# Patient Record
Sex: MALE | Race: WHITE | NOT HISPANIC OR LATINO
[De-identification: names, ages, dates, MRNs, and addresses within clinical notes are randomized per-mention and may not be internally consistent; named-entity substitution may affect disease eponyms.]

---

## 2017-07-07 ENCOUNTER — MESSAGE (OUTPATIENT)
Age: 42
End: 2017-07-07

## 2019-06-24 ENCOUNTER — EMERGENCY (EMERGENCY)
Facility: HOSPITAL | Age: 44
LOS: 1 days | End: 2019-06-24
Admitting: EMERGENCY MEDICINE
Payer: COMMERCIAL

## 2019-06-24 ENCOUNTER — EMERGENCY (EMERGENCY)
Facility: HOSPITAL | Age: 44
LOS: 1 days | End: 2019-06-24
Admitting: EMERGENCY MEDICINE
Payer: SELF-PAY

## 2019-06-24 PROCEDURE — 99284 EMERGENCY DEPT VISIT MOD MDM: CPT

## 2019-06-25 PROCEDURE — 93010 ELECTROCARDIOGRAM REPORT: CPT

## 2023-09-06 ENCOUNTER — INPATIENT (INPATIENT)
Facility: HOSPITAL | Age: 48
LOS: 3 days | Discharge: HOME | DRG: 53 | End: 2023-09-10
Attending: INTERNAL MEDICINE | Admitting: INTERNAL MEDICINE
Payer: COMMERCIAL

## 2023-09-06 VITALS
TEMPERATURE: 98 F | RESPIRATION RATE: 18 BRPM | OXYGEN SATURATION: 93 % | WEIGHT: 179.9 LBS | SYSTOLIC BLOOD PRESSURE: 162 MMHG | HEART RATE: 118 BPM | DIASTOLIC BLOOD PRESSURE: 96 MMHG | HEIGHT: 67 IN

## 2023-09-06 DIAGNOSIS — R56.9 UNSPECIFIED CONVULSIONS: ICD-10-CM

## 2023-09-06 DIAGNOSIS — S82.91XA UNSPECIFIED FRACTURE OF RIGHT LOWER LEG, INITIAL ENCOUNTER FOR CLOSED FRACTURE: Chronic | ICD-10-CM

## 2023-09-06 LAB
ADD ON TEST-SPECIMEN IN LAB: SIGNIFICANT CHANGE UP
ALBUMIN SERPL ELPH-MCNC: 4.2 G/DL — SIGNIFICANT CHANGE UP (ref 3.3–5)
ALP SERPL-CCNC: 78 U/L — SIGNIFICANT CHANGE UP (ref 40–120)
ALT FLD-CCNC: 137 U/L — HIGH (ref 12–78)
AMPHET UR-MCNC: NEGATIVE — SIGNIFICANT CHANGE UP
ANION GAP SERPL CALC-SCNC: 17 MMOL/L — SIGNIFICANT CHANGE UP (ref 5–17)
AST SERPL-CCNC: 93 U/L — HIGH (ref 15–37)
BARBITURATES UR SCN-MCNC: NEGATIVE — SIGNIFICANT CHANGE UP
BASOPHILS # BLD AUTO: 0.05 K/UL — SIGNIFICANT CHANGE UP (ref 0–0.2)
BASOPHILS NFR BLD AUTO: 0.5 % — SIGNIFICANT CHANGE UP (ref 0–2)
BENZODIAZ UR-MCNC: NEGATIVE — SIGNIFICANT CHANGE UP
BILIRUB SERPL-MCNC: 1.4 MG/DL — HIGH (ref 0.2–1.2)
BUN SERPL-MCNC: 12 MG/DL — SIGNIFICANT CHANGE UP (ref 7–23)
CALCIUM SERPL-MCNC: 8.8 MG/DL — SIGNIFICANT CHANGE UP (ref 8.5–10.1)
CHLORIDE SERPL-SCNC: 99 MMOL/L — SIGNIFICANT CHANGE UP (ref 96–108)
CK SERPL-CCNC: 194 U/L — SIGNIFICANT CHANGE UP (ref 26–308)
CO2 SERPL-SCNC: 19 MMOL/L — LOW (ref 22–31)
COCAINE METAB.OTHER UR-MCNC: NEGATIVE — SIGNIFICANT CHANGE UP
CREAT SERPL-MCNC: 1.36 MG/DL — HIGH (ref 0.5–1.3)
EGFR: 64 ML/MIN/1.73M2 — SIGNIFICANT CHANGE UP
EOSINOPHIL # BLD AUTO: 0 K/UL — SIGNIFICANT CHANGE UP (ref 0–0.5)
EOSINOPHIL NFR BLD AUTO: 0 % — SIGNIFICANT CHANGE UP (ref 0–6)
ETHANOL SERPL-MCNC: <10 MG/DL — SIGNIFICANT CHANGE UP (ref 0–10)
GLUCOSE SERPL-MCNC: 141 MG/DL — HIGH (ref 70–99)
HCT VFR BLD CALC: 41.9 % — SIGNIFICANT CHANGE UP (ref 39–50)
HGB BLD-MCNC: 15.4 G/DL — SIGNIFICANT CHANGE UP (ref 13–17)
IMM GRANULOCYTES NFR BLD AUTO: 0.4 % — SIGNIFICANT CHANGE UP (ref 0–0.9)
LYMPHOCYTES # BLD AUTO: 0.66 K/UL — LOW (ref 1–3.3)
LYMPHOCYTES # BLD AUTO: 6.7 % — LOW (ref 13–44)
MCHC RBC-ENTMCNC: 34.5 PG — HIGH (ref 27–34)
MCHC RBC-ENTMCNC: 36.8 GM/DL — HIGH (ref 32–36)
MCV RBC AUTO: 93.9 FL — SIGNIFICANT CHANGE UP (ref 80–100)
METHADONE UR-MCNC: NEGATIVE — SIGNIFICANT CHANGE UP
MONOCYTES # BLD AUTO: 0.9 K/UL — SIGNIFICANT CHANGE UP (ref 0–0.9)
MONOCYTES NFR BLD AUTO: 9.1 % — SIGNIFICANT CHANGE UP (ref 2–14)
NEUTROPHILS # BLD AUTO: 8.2 K/UL — HIGH (ref 1.8–7.4)
NEUTROPHILS NFR BLD AUTO: 83.3 % — HIGH (ref 43–77)
OPIATES UR-MCNC: NEGATIVE — SIGNIFICANT CHANGE UP
PCP SPEC-MCNC: SIGNIFICANT CHANGE UP
PCP UR-MCNC: NEGATIVE — SIGNIFICANT CHANGE UP
PLATELET # BLD AUTO: 367 K/UL — SIGNIFICANT CHANGE UP (ref 150–400)
POTASSIUM SERPL-MCNC: 3.5 MMOL/L — SIGNIFICANT CHANGE UP (ref 3.5–5.3)
POTASSIUM SERPL-SCNC: 3.5 MMOL/L — SIGNIFICANT CHANGE UP (ref 3.5–5.3)
PROT SERPL-MCNC: 7.8 GM/DL — SIGNIFICANT CHANGE UP (ref 6–8.3)
RBC # BLD: 4.46 M/UL — SIGNIFICANT CHANGE UP (ref 4.2–5.8)
RBC # FLD: 11.9 % — SIGNIFICANT CHANGE UP (ref 10.3–14.5)
SODIUM SERPL-SCNC: 135 MMOL/L — SIGNIFICANT CHANGE UP (ref 135–145)
THC UR QL: NEGATIVE — SIGNIFICANT CHANGE UP
WBC # BLD: 9.85 K/UL — SIGNIFICANT CHANGE UP (ref 3.8–10.5)
WBC # FLD AUTO: 9.85 K/UL — SIGNIFICANT CHANGE UP (ref 3.8–10.5)

## 2023-09-06 PROCEDURE — 95816 EEG AWAKE AND DROWSY: CPT | Mod: 26

## 2023-09-06 PROCEDURE — 95819 EEG AWAKE AND ASLEEP: CPT

## 2023-09-06 PROCEDURE — 99223 1ST HOSP IP/OBS HIGH 75: CPT

## 2023-09-06 PROCEDURE — 80307 DRUG TEST PRSMV CHEM ANLYZR: CPT

## 2023-09-06 PROCEDURE — 36415 COLL VENOUS BLD VENIPUNCTURE: CPT

## 2023-09-06 PROCEDURE — 84100 ASSAY OF PHOSPHORUS: CPT

## 2023-09-06 PROCEDURE — 83735 ASSAY OF MAGNESIUM: CPT

## 2023-09-06 PROCEDURE — 76705 ECHO EXAM OF ABDOMEN: CPT | Mod: 26

## 2023-09-06 PROCEDURE — 99291 CRITICAL CARE FIRST HOUR: CPT

## 2023-09-06 PROCEDURE — 83690 ASSAY OF LIPASE: CPT

## 2023-09-06 PROCEDURE — 95816 EEG AWAKE AND DROWSY: CPT

## 2023-09-06 PROCEDURE — 99497 ADVNCD CARE PLAN 30 MIN: CPT | Mod: 25

## 2023-09-06 PROCEDURE — 85027 COMPLETE CBC AUTOMATED: CPT

## 2023-09-06 PROCEDURE — 95700 EEG CONT REC W/VID EEG TECH: CPT

## 2023-09-06 PROCEDURE — 85610 PROTHROMBIN TIME: CPT

## 2023-09-06 PROCEDURE — 85730 THROMBOPLASTIN TIME PARTIAL: CPT

## 2023-09-06 PROCEDURE — 80074 ACUTE HEPATITIS PANEL: CPT

## 2023-09-06 PROCEDURE — 93010 ELECTROCARDIOGRAM REPORT: CPT

## 2023-09-06 PROCEDURE — 95714 VEEG EA 12-26 HR UNMNTR: CPT

## 2023-09-06 PROCEDURE — 74177 CT ABD & PELVIS W/CONTRAST: CPT

## 2023-09-06 PROCEDURE — 83036 HEMOGLOBIN GLYCOSYLATED A1C: CPT

## 2023-09-06 PROCEDURE — 76705 ECHO EXAM OF ABDOMEN: CPT

## 2023-09-06 PROCEDURE — 80053 COMPREHEN METABOLIC PANEL: CPT

## 2023-09-06 PROCEDURE — 84443 ASSAY THYROID STIM HORMONE: CPT

## 2023-09-06 PROCEDURE — 70450 CT HEAD/BRAIN W/O DYE: CPT | Mod: 26,MA

## 2023-09-06 RX ORDER — ACETAMINOPHEN 500 MG
650 TABLET ORAL EVERY 6 HOURS
Refills: 0 | Status: DISCONTINUED | OUTPATIENT
Start: 2023-09-06 | End: 2023-09-10

## 2023-09-06 RX ORDER — THIAMINE MONONITRATE (VIT B1) 100 MG
100 TABLET ORAL DAILY
Refills: 0 | Status: COMPLETED | OUTPATIENT
Start: 2023-09-06 | End: 2023-09-09

## 2023-09-06 RX ORDER — LEVETIRACETAM 250 MG/1
500 TABLET, FILM COATED ORAL
Refills: 0 | Status: DISCONTINUED | OUTPATIENT
Start: 2023-09-06 | End: 2023-09-10

## 2023-09-06 RX ORDER — SODIUM CHLORIDE 9 MG/ML
1000 INJECTION, SOLUTION INTRAVENOUS
Refills: 0 | Status: COMPLETED | OUTPATIENT
Start: 2023-09-06 | End: 2023-09-07

## 2023-09-06 RX ORDER — FOLIC ACID 0.8 MG
1 TABLET ORAL DAILY
Refills: 0 | Status: DISCONTINUED | OUTPATIENT
Start: 2023-09-06 | End: 2023-09-10

## 2023-09-06 RX ORDER — ESCITALOPRAM OXALATE 10 MG/1
20 TABLET, FILM COATED ORAL DAILY
Refills: 0 | Status: DISCONTINUED | OUTPATIENT
Start: 2023-09-06 | End: 2023-09-10

## 2023-09-06 RX ORDER — ONDANSETRON 8 MG/1
4 TABLET, FILM COATED ORAL EVERY 6 HOURS
Refills: 0 | Status: DISCONTINUED | OUTPATIENT
Start: 2023-09-06 | End: 2023-09-10

## 2023-09-06 RX ORDER — LIDOCAINE 4 G/100G
5 CREAM TOPICAL THREE TIMES A DAY
Refills: 0 | Status: DISCONTINUED | OUTPATIENT
Start: 2023-09-06 | End: 2023-09-10

## 2023-09-06 RX ORDER — LEVETIRACETAM 250 MG/1
1000 TABLET, FILM COATED ORAL ONCE
Refills: 0 | Status: COMPLETED | OUTPATIENT
Start: 2023-09-06 | End: 2023-09-06

## 2023-09-06 RX ORDER — SODIUM CHLORIDE 9 MG/ML
1 INJECTION INTRAMUSCULAR; INTRAVENOUS; SUBCUTANEOUS DAILY
Refills: 0 | Status: DISCONTINUED | OUTPATIENT
Start: 2023-09-06 | End: 2023-09-10

## 2023-09-06 RX ORDER — SODIUM CHLORIDE 9 MG/ML
1000 INJECTION INTRAMUSCULAR; INTRAVENOUS; SUBCUTANEOUS ONCE
Refills: 0 | Status: COMPLETED | OUTPATIENT
Start: 2023-09-06 | End: 2023-09-06

## 2023-09-06 RX ADMIN — SODIUM CHLORIDE 2000 MILLILITER(S): 9 INJECTION INTRAMUSCULAR; INTRAVENOUS; SUBCUTANEOUS at 13:07

## 2023-09-06 RX ADMIN — Medication 2 MILLIGRAM(S): at 15:37

## 2023-09-06 RX ADMIN — LEVETIRACETAM 400 MILLIGRAM(S): 250 TABLET, FILM COATED ORAL at 15:39

## 2023-09-06 NOTE — H&P ADULT - NSICDXFAMILYHX_GEN_ALL_CORE_FT
FAMILY HISTORY:  Father  Still living? Unknown  Patient's father is , Age at diagnosis: Age Unknown

## 2023-09-06 NOTE — ED PROVIDER NOTE - NS ED ROS FT
GENERAL: no fever, no chills, no weight loss  EYES: no change in vision, no irritation, no discharge, no redness, no pain  HEENT: no trouble swallowing or speaking, no throat pain, no ear pain, laceration to left side of tongue  CARDIAC: no chest pain, no palpitations   PULMONARY: no cough, SOB, wheezing, BAIRES  GI: no abdominal pain, no nausea, no vomiting, no diarrhea, no constipation, no melena, no hematochezia, no hematemesis  : no changes in urination, no dysuria, no frequency, no hematuria, no discharge  NEURO: no headache, no numbness, no weakness  MSK: no joint pain, no muscle pain, no back pain, no calf pain

## 2023-09-06 NOTE — ED PROVIDER NOTE - PROGRESS NOTE DETAILS
Joseline Crain for attending Dr. Pulliam  47 y/o male with a PMHx of seizures takes sodium pills prescribed Neurologist; presenting to the ED c/o witnessed seizure, +head-strike. Patient was at an interview when he suddenly began to seize, lasted about a minute. History of seizures and head injuries. Denies neck pain. Last seizure was six weeks ago. d/w dr stuart for consult. ordered eeg. SE,MD dr behzad maghsoudlou [place] neurologist [phone] MD CRISTINE d/w dr stuart for consult. ordered eeg. SE,MD dr behzad maghsoudlou [place] neurologist [phone] MD CRISTINE d/w dr stuart for consult. ordered eeg. SE,MD dr behzad maghsoudlou [place] neurologist [phone]   This number is not dr DESIR's phone number. pt unable to provide a different number. unable to fiind another number on internet search. MD CRISTINE s/o to dr lagos pending EEG. MD CRISTINE pt had seizure in ED while having the EEG test. Was generalized, tonic/clonic. pt was in stretcher, no fall or head strike. given 2 mg ativan iv and keppra load ordered.   dr stuart aware and advise admit for obs. MD CRISTINE

## 2023-09-06 NOTE — ED ADULT NURSE REASSESSMENT NOTE - NS ED NURSE REASSESS COMMENT FT1
Pt observed having a seizure, 2mg ativan IV administered as per , will administer Keppra as per md order. Pt safe with stretcher in lowest position. Pt observed having a seizure lasting between 30-40 seconds while pt is having an eeg, 2mg ativan IV administered as per , will administer Keppra as per md order. Pt safe with stretcher in lowest position.

## 2023-09-06 NOTE — ED PROVIDER NOTE - PHYSICAL EXAMINATION
Constitutional: NAD AOx3, mild delay in response to questions, flat affect  Eyes: PERRL EOMI  Head: Normocephalic atraumatic  Mouth: MMM  Cardiac: Tachycardic rate and rhythm  Resp: Lungs CTAB  GI: Abd s/nd/nt  Neuro: CN2-12 grossly intact, TOLEDO x 4  Skin: No visible rashes

## 2023-09-06 NOTE — ED ADULT NURSE REASSESSMENT NOTE - NS ED NURSE REASSESS COMMENT FT1
Pt observed alert, states he feels foggy and is forgetful, no seizure like activity observed at this time, all questions addressed.

## 2023-09-06 NOTE — H&P ADULT - NSICDXPASTMEDICALHX_GEN_ALL_CORE_FT
PAST MEDICAL HISTORY:  Chews tobacco regularly     ETOH abuse     H/O: depression     Noncompliance     Seizures     Sodium deficiency     TBI (traumatic brain injury)

## 2023-09-06 NOTE — ED ADULT NURSE NOTE - NSSEPSISSUSPECTED_ED_A_ED
PROVIDER:[TOKEN:[[id number]],FOLLOWUP:[4-6 Days]],PROVIDER:[TOKEN:[[id number]],FOLLOWUP:[7-10 Days]] PROVIDER:[TOKEN:[[id number]],FOLLOWUP:[1-3 Days]],PROVIDER:[TOKEN:[[id number]],FOLLOWUP:[1-3 Days]] No

## 2023-09-06 NOTE — ED PROVIDER NOTE - IV ALTEPLASE EXCL ABS HIDDEN
CHIEF COMPLAINT    Chief Complaint   Patient presents with   • Abdominal Pain       HPI    Patient is here for left lower quadrant pain started 3 and rather sudden she feels like she is being stabbed by an eye for last for fiber 10 minutes and then passed she has a loose stool this morning she is using had more constipation she has had no black stool bloody stool she has had some nausea did threw up several times this morning but it is better now she has had no fever no shaking chills she was recently seen in urgent care and treated for UTI did not culture out his UTI she was treated with Bactrim at that time she had left flank pain but she said this is different she has had no history of kidney stones in the past    Allergies    ALLERGIES:   Allergen Reactions   • Contrast Media HIVES       Current Medications   No current facility-administered medications for this encounter.     Current Outpatient Medications   Medication Sig Dispense Refill   • ketoconazole (NIZORAL) 200 MG tablet Take 2 tablets by mouth daily. 400 mg once for treatment of tinea versicolor 1 tablet 0   • ibuprofen (MOTRIN) 800 MG tablet Take 1 tablet by mouth 3 times daily as needed for Pain. 30 tablet 0   • sulfamethoxazole-trimethoprim (Bactrim DS) 800-160 MG per tablet Take 1 tablet by mouth in the morning and 1 tablet in the evening. Do all this for 7 days. 14 tablet 0   • acetaminophen (TYLENOL) 325 MG tablet Take 2 tablets by mouth every 6 hours as needed for Pain. 30 tablet 0       Past Medical History    Past Medical History:   Diagnosis Date   • Bronchitis    • Ovarian cyst        Surgical History    Past Surgical History:   Procedure Laterality Date   • Tonsillectomy     • [place] tooth extraction         Social History    Social History     Tobacco Use   • Smoking status: Current Every Day Smoker     Packs/day: 0.25     Types: Cigarettes   • Smokeless tobacco: Never Used   • Tobacco comment: 3 cig per day   Substance Use Topics   •  Alcohol use: Yes     Comment: occasionally   • Drug use: No       Family History    No family history on file.    REVIEW OF SYSTEMS    ALL 13 SYSTEMS REVIEWED AND NEGATIVE OR NONCONTRIBUTORY UNLESS OTHERWISE NOTED IN HPI      PHYSICAL EXAM     ED Triage Vitals [11/16/22 0856]   ED Triage Vitals Group      Temp 98.4 °F (36.9 °C)      Heart Rate 94      Resp 20      /80      SpO2 98 %      EtCO2 mmHg       Height 5' 7\" (1.702 m)      Weight 250 lb 7.1 oz (113.6 kg)      Weight Scale Used Standing scale      BMI (Calculated) 39.22      IBW/kg (Calculated) 61.6       Gen:   AAOx3 in NAD  Head:  Normocephalic, without abnormal findings  HEENT:   PERRL, EOMI without Nystagmus. Sclera anicteric ptosis left eye from previous Bell's palsy she cannot wrinkle her left upper forehead and moderate ptosis infection was 2 years ago   Nose:  Clear without blood or purulent mucous   Mouth: Patent without swelling.  Moist well perfused mucous membranes  Neck: No masses, thyromegaly, posterior tenderness or meningeal signs  Resp: CTAB without wheezes, rhonchi, or rales.  CVS: RRR without significant murmur, rub or gallop  ABD: Soft abdomen it does hurt when she supine lift her legs reproduces the symptoms slightly she has pain with palpation left lower quadrant but no rebound no guarding no peritoneal signs otherwise  Back: No CVA tenderness, deformities, swelling or ecchymosis  Skin on the right side of her back right lower lumbar shows rash consistent with tinea versicolor she has a small island of several lesions left midthoracic rash is consistent with tinea versicolor dark brown pigment with underlying fair skin we will treat that with ketoconazole 40 mg single does as far as her abdominal pain the KUB shows a lot of stool in my opinion urine is negative for any kidney stone or infection she will treat that as constipation with milk of magnesia better diet     Procedures      MDM  Repeat urine to rule out stone or missed  infection will do a KUB looking for constipation  Constipation treat with dietary changes milk of magnesia tinea versicolor treat with ketoconazole 400 mg single dose      Labs  Results for orders placed or performed during the hospital encounter of 11/16/22   Urinalysis & Reflex Microscopy With Culture If Indicated   Result Value    COLOR, URINALYSIS Yellow    APPEARANCE, URINALYSIS Clear    GLUCOSE, URINALYSIS Negative    BILIRUBIN, URINALYSIS Negative    KETONES, URINALYSIS Negative    SPECIFIC GRAVITY, URINALYSIS 1.020    OCCULT BLOOD, URINALYSIS Negative    PH, URINALYSIS 8.0 (H)    PROTEIN, URINALYSIS Negative    UROBILINOGEN, URINALYSIS 0.2    NITRITE, URINALYSIS Negative    LEUKOCYTE ESTERASE, URINALYSIS Negative         Radiology  XR Abdomen AP Kub    (Results Pending)         Medications - No data to display    Rechecks          Consults      Diagnosis:  ED Diagnoses     Diagnosis Comment Associated Orders       Final diagnoses    Constipation, unspecified constipation type -- --    Tinea versicolor -- --               Summary of your Discharge Medications      Take these Medications      Details   ketoconazole 200 MG tablet  Commonly known as: NIZORAL   Take 2 tablets by mouth daily. 400 mg once for treatment of tinea versicolor            Follow Up:  April MAHESH Argueta  [address] [facility] [id number] [phone]    In 1 week  As needed, If symptoms worsen     Patient was instructed to return to the Urgent Care immediately if symptoms worsen or any new unusual symptoms arise.         Recheck on patient. Discussed with patient, findings and plan for discharge. Patient was given Urgent Care warnings, discharge instructions, and follow up information to go home with. Patient understands and agrees with plan for discharge. Any questions have been answered.               Thiago Davey,   11/16/22 0941     show

## 2023-09-06 NOTE — ED ADULT TRIAGE NOTE - CHIEF COMPLAINT QUOTE
Pt presents to ER s/p witnessed seizure. Pt was at an interview when he began to seize; lasted about on minute. Pt hit back of head on ground. Hx of head injury/seizures. Airway patent. no blood thinners

## 2023-09-06 NOTE — ED ADULT NURSE NOTE - OBJECTIVE STATEMENT
Pt presents to er with complaints of witnessed seizure while having an interview, states he is on sodium chloride pills for seizure management and has not taken them for the past 3 days because he didn't get around to it, pt denies headache, cervical tenderness, pt alert to name, place, time, situation, states his last seizure was 6 weeks ago.

## 2023-09-06 NOTE — ED PROVIDER NOTE - OBJECTIVE STATEMENT
Pt is a 47 yo M with PMHx of seizures presenting after witnessed seizure at job interview. Pt states that about 3 years ago he slipped in the shower and hit his head. Since then he has had multiple seizures, he states that he is unsure what type of seizure or how many. He states that he is on a salt pill for seizure management. Pt states that he was told he hit his head during the seizure. AOx3, denies headaches, denies dizziness, N/V. States that he bit his tongue during the event. Denies specific aura before seizure comes on. Pt denies other medical conditions. NKDA. Pt is a 47 yo M with PMHx of seizures presenting after witnessed seizure at job interview. Pt states that about 3 years ago he slipped in the shower and hit his head. Since then he has had multiple seizures, he states that he is unsure what type of seizure or how many. He states that he is on a salt pill for seizure management. Pt does not recall name of neurologist seen for seizures but says follows with PCP for it. Pt states that he was told he hit his head during the seizure. AOx3, denies headaches, denies dizziness, N/V. States that he bit his tongue during the event. Denies specific aura before seizure comes on. Pt denies other medical conditions. NKDA.

## 2023-09-06 NOTE — H&P ADULT - HISTORY OF PRESENT ILLNESS
Pt is a 49 yo male with a pmh/o TBI, prior seizures (states has had a min of 3-4 over past few years with last occurring 2-3 mo ago, unwitnessed but noted to have bitten tongue each time), who chews tobacco and drinks etoh 3-4 night a week (states has about 4 beers each time) with last drink being 2 days ago where he drank a six pack, who is also non compliant with sodium tablets (instead of daily takes every 2-3 days) or follow up with neurologist Dr. Márquez, who is also non compliant with escitalopram for which he takes for depression, who presented to ED after having witness seizure during a job interview. Pt states prior to seizure he felt "sweaty and foggy in the head". States sat in car with cold air and sx subsided and believed interview went well however pt then states he awoke on the floor surrounded by EMT. Pt currently c/o sluggish speech and fatigue. Pt also with pain to left side of tongue where he has a laceration from bite.      ED course: Pt evaluation by neurology, EEG performed and showing evidence of seizure activity. Keppra started.

## 2023-09-06 NOTE — H&P ADULT - TIME BILLING
A minimum of 75 min was spent completing this admission not including time spent discussing advanced care planning or discussing goals of care with a minimum of 36 min spent face to face with patient while in ED unit on admission, counseling patient on current acute on chronic conditions and discussing plan and coordination of care including diagnostic imaging such as EEG in AM, diagnostic laboratory tests which were ordered and reasoning behind each test, discussion of consultants ordered to evaluate patient in am and reasoning behind each specific need such as neurology , SBIRT.

## 2023-09-06 NOTE — CONSULT NOTE ADULT - ASSESSMENT
49 yo M with PMHx of TBI 3 years ago, subsequently had seizures, f/u with PMD at [place] including a neurologist, does not take any antiseizure medications, takes NaCl pills for salt wasting following TBI; he presented to ED today after a witnessed seizure while at a job interview today, lasted about a minute. In ED, he was initially confused/postictal, upon neuro exam; patient's mental sensorium had improved.  Routine EEG was ordered, while pt was having EEG in ED, he has another GTC seizure lasting between 30-40 seconds, was given 2 mg ativan IV followed by Keppra load.    # 2 seizures within 4-5 hours, history of seizures following TBI, patient unclear about his seizure history, does not recall when last seizure was.  EEG suggestive of focal onset seizure initiating from right frontotemporal region secondary generalization    # History of TBI    # Elevated transaminases and creatinine    - Continue Keppra 500 Mg twice daily  - Monitor LFTs and creatinine, obtain CPK  - 24-hour EEG monitoring in a.m.  - Admit for obs and seizure precautions    Above D/W Dr. Barraza

## 2023-09-06 NOTE — ED ADULT NURSE NOTE - NSFALLUNIVINTERV_ED_ALL_ED
Bed/Stretcher in lowest position, wheels locked, appropriate side rails in place/Call bell, personal items and telephone in reach/Instruct patient to call for assistance before getting out of bed/chair/stretcher/Non-slip footwear applied when patient is off stretcher/[place] to call system/Physically safe environment - no spills, clutter or unnecessary equipment/Purposeful proactive rounding/Room/bathroom lighting operational, light cord in reach

## 2023-09-06 NOTE — ED ADULT NURSE REASSESSMENT NOTE - NS ED NURSE REASSESS COMMENT FT1
Pt denies chest pain, sob, no observable seizure like activity at this time, states he just feels out of it.

## 2023-09-06 NOTE — H&P ADULT - ASSESSMENT
47 yo male with pmh/o TBI, etoh abuse, tobacco use, prior seizures, sodium deficiency on salt tabs, admitted due to;    #Seizure  Admit to med/surg with remote tele and   EEG 24 hs ordered for AM  CPK wnl  s/p 1L IVF, supplemental IVF ordered x 1L  fall, aspiration, seizure precautions  neurochecks q 4 hrs  OOB and ambulate with assitance  Keppra 500 bid ordered  lidocaine swish and spit for tongue laceration  SCD for dvt ppx    #Renal insufficiency  Cr 1.36, baseline unknown  IVF as above  f/u CMP in AM    #Hyperglycemia  f/u A1c  monitor on serum chemistry in AM    #Transaminitis  #ETOH abuse  CIWA for trigger only  cessation counseling  RUQ US to evaluate for cirrhosis/fatty liver    #Depression  c/w escitalopram    #Na deficiency   c/w salt tabs    #Tobacco use  cessation counseling  declined NRT    #Noncompliance  Pt non compliant with medications, stating sometimes he forgets and he doesn't really like taking pills  Compliance endorsed

## 2023-09-06 NOTE — ED ADULT TRIAGE NOTE - WEIGHT IN KG
81.6 Sski Pregnancy And Lactation Text: This medication is Pregnancy Category D and isn't considered safe during pregnancy. It is excreted in breast milk.

## 2023-09-06 NOTE — ED PROVIDER NOTE - NSFOLLOWUPINSTRUCTIONS_ED_ALL_ED_FT
Seizure, Adult  When you have a seizure:    Parts of your body may move.  How aware or awake (conscious) you are may change.  You may shake (convulse).    Some people have symptoms right before a seizure happens. These symptoms may include:    Fear.  Worry (anxiety).  Feeling like you are going to throw up (nausea).  Feeling like the room is spinning (vertigo).  Feeling like you saw or heard something before (felicia vu).  Odd tastes or smells.  Changes in vision, such as seeing flashing lights or spots.    ImageSeizures usually last from 30 seconds to 2 minutes. Usually, they are not harmful unless they last a long time.    Follow these instructions at home:  Medicines     Take over-the-counter and prescription medicines only as told by your doctor.  Avoid anything that may keep your medicine from working, such as alcohol.  Activity     Do not do any activities that would be dangerous if you had another seizure, like driving or swimming. Wait until your doctor approves.  If you live in the [place] ask your local DMV (department of [place]) when you can drive.  Rest.  Teaching others     Teach friends and family what to do when you have a seizure. They should:    Lay you on the ground.  Protect your head and body.  Loosen any tight clothing around your neck.  Turn you on your side.  Stay with you until you are better.  Not hold you down.  Not put anything in your mouth.  Know whether or not you need emergency care.    General instructions     Contact your doctor each time you have a seizure.  Avoid anything that gives you seizures.  Keep a seizure diary. Write down:    What you think caused each seizure.  What you remember about each seizure.    Keep all follow-up visits as told by your doctor. This is important.  Contact a doctor if:  You have another seizure.  You have seizures more often.  There is any change in what happens during your seizures.  You continue to have seizures with treatment.  You have symptoms of being sick or having an infection.  Get help right away if:  You have a seizure:    That lasts longer than 5 minutes.  That is different than seizures you had before.  That makes it harder to breathe.  After you hurt your head.    After a seizure, you cannot speak or use a part of your body.  After a seizure, you are confused or have a bad headache.  You have two or more seizures in a row.  You are having seizures more often.  You do not wake up right after a seizure.  You get hurt during a seizure.  In an emergency:     These symptoms may be an emergency. Do not wait to see if the symptoms will go away. Get medical help right away. Call your local emergency services (911 in the [place] Do not drive yourself to the hospital.   This information is not intended to replace advice given to you by your health care provider. Make sure you discuss any questions you have with your health care provider. ave with your health care provider.

## 2023-09-06 NOTE — ED ADULT NURSE REASSESSMENT NOTE - NS ED NURSE REASSESS COMMENT FT1
Pt. received from previous RN in stable condition, no s/sx of resp. distress/pain.  Able to make all needs known, no seizure activity witnessed at this time.  Laceration to lt. side of tongue observed.  Abd. sono completed, CIWA protocol and neuro checks in prog.  VSS, safety maintained, and emotional support provided.  Call bell within reach, will continue to monitor.

## 2023-09-06 NOTE — CONSULT NOTE ADULT - SUBJECTIVE AND OBJECTIVE BOX
CC 48y old  Male who presents with a chief complaint of seizure    HPI:  49 yo M with PMHx of TBI and seizures presented to ED after witnessed seizure at job interview. [place] was at a job interview in [place] today, while seated on a chair he has no recollection as to what happened, he apparently had a seizure that lasted about a minute, per triage note, pt hit back of head on ground, no major injuries, but had a small tongue bite. In ED, he was initially confused and postictal, neurology was consulted, upon my exam; patient's mental sensorium had cleared, he was able to provide history.    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  Patient is unclear and evasive about the seizures, he does not recall when his last seizure was.     I recommended routine EEG and close monitoring, while pt was having EEG in ED, he has another GTC seizure lasting between 30-40 seconds was given 2 mg ativan IV followed by Keppra load.      PAST MEDICAL & SURGICAL HISTORY:  TBI   Seizure      FAMILY HISTORY:  No relevant history      Social Hx: Worked in sales / insurance, denied smoker, no drug use      MEDICATIONS  (STANDING):  Nacl pills       Allergies  No Known Allergies    Intolerances      ROS: Pertinent positives in HPI, all other ROS were reviewed and are negative.        Vital Signs Last 24 Hrs  T(C): 36.8 (06 Sep 2023 12:30), Max: 36.8 (06 Sep 2023 12:30)  T(F): 98.3 (06 Sep 2023 12:30), Max: 98.3 (06 Sep 2023 12:30)  HR: 99 (06 Sep 2023 15:30) (97 - 118)  BP: 158/95 (06 Sep 2023 15:30) (147/94 - 162/96)  BP(mean): 111 (06 Sep 2023 15:30) (107 - 111)  RR: 18 (06 Sep 2023 15:30) (17 - 18)  SpO2: 97% (06 Sep 2023 15:01) (93% - 97%)    Parameters below as of 06 Sep 2023 15:01  Patient On (Oxygen Delivery Method): room air      Gen exam:  Normocephalic, in no distress, awake and alert.  HEENT: PERRLA, EOMI,   Neck: Supple.  Respiratory: Breath sounds are clear bilaterally  Cardiovascular: S1 and S2, regular   Extremities:  no edema  Vascular: Carotid Bruit - no  Musculoskeletal: no joint swelling/tenderness, no abnormal movements  Skin: No rashes      Neurological exam:  HF: A x O x 3. interactive, normal affect. Speech fluent, No Aphasia or paraphasic errors. Naming /repetition intact   CN: JIMY, EOMI, VFF, facial sensation normal, no NLFD, tongue bite +, Palate moves equally,  Motor: No pronator drift, Strength 5/5 in all 4 ext, normal bulk and tone, no tremor  Sens: Intact to light touch   Reflexes: Symmetric and normal downgoing toes b/l  Coord:  No FNFA,   Gait/Balance: Not tested          Labs:   09-06    135  |  99  |  12  ----------------------------<  141<H>  3.5   |  19<L>  |  1.36<H>    Ca    8.8      06 Sep 2023 12:44    TPro  7.8  /  Alb  4.2  /  TBili  1.4<H>  /  DBili  x   /  AST  93<H>  /  ALT  137<H>  /  AlkPhos  78  09-06                        15.4   9.85  )-----------( 367      ( 06 Sep 2023 12:44 )             41.9       Radiology:  -< from: CT Head No Cont (09.06.23 @ 13:50) >  No hydrocephalus, mass effect, midline shift, acute intracranial   hemorrhage, or brain edema.    Encephalomalacia and gliosis in the inferior anterior frontal lobes,   likely post traumatic in nature.    Intraorbital contents unremarkable.    Displaced left occipital bone fracture appears chronic.    And right posterior ethmoid sinus mucosal thickening, remaining   visualized paranasal sinuses and mastoid air cells are clear.    IMPRESSION:    No acute intracranial hemorrhage, brain edema, or mass effect.    Displaced left occipital bone fracture appears chronic.      < from: EEG Awake or Drowsy (09.06.23 @ 16:00) >  EEG Summary/Classification:  This was an abnormal EEG study in the awake and drowsy states:  -A reported clinical event accompanied by rhythmic activity in the right   frontotemporal region with spread to generalized fast activity  -Generalized slowing following the clinical event.    EEG  Impression:  This EEG without video captured a clinical event. Based on the limited   EEG findings (electrodes became removed) there is suspicion of a focal   onset seizure (possibly right frontotemporal) with rapid generalization.  Generalized slowing was likely a postictal phenomenon.  Consider a repeat study if clinically indicated.

## 2023-09-07 LAB
A1C WITH ESTIMATED AVERAGE GLUCOSE RESULT: 5.2 % — SIGNIFICANT CHANGE UP (ref 4–5.6)
ALBUMIN SERPL ELPH-MCNC: 3.3 G/DL — SIGNIFICANT CHANGE UP (ref 3.3–5)
ALP SERPL-CCNC: 64 U/L — SIGNIFICANT CHANGE UP (ref 40–120)
ALT FLD-CCNC: 126 U/L — HIGH (ref 12–78)
ANION GAP SERPL CALC-SCNC: 6 MMOL/L — SIGNIFICANT CHANGE UP (ref 5–17)
APTT BLD: 27.2 SEC — SIGNIFICANT CHANGE UP (ref 24.5–35.6)
AST SERPL-CCNC: 138 U/L — HIGH (ref 15–37)
BILIRUB SERPL-MCNC: 2 MG/DL — HIGH (ref 0.2–1.2)
BUN SERPL-MCNC: 7 MG/DL — SIGNIFICANT CHANGE UP (ref 7–23)
CALCIUM SERPL-MCNC: 7.7 MG/DL — LOW (ref 8.5–10.1)
CHLORIDE SERPL-SCNC: 105 MMOL/L — SIGNIFICANT CHANGE UP (ref 96–108)
CO2 SERPL-SCNC: 26 MMOL/L — SIGNIFICANT CHANGE UP (ref 22–31)
CREAT SERPL-MCNC: 0.8 MG/DL — SIGNIFICANT CHANGE UP (ref 0.5–1.3)
EGFR: 109 ML/MIN/1.73M2 — SIGNIFICANT CHANGE UP
ESTIMATED AVERAGE GLUCOSE: 103 MG/DL — SIGNIFICANT CHANGE UP (ref 68–114)
GLUCOSE SERPL-MCNC: 93 MG/DL — SIGNIFICANT CHANGE UP (ref 70–99)
HCT VFR BLD CALC: 37.3 % — LOW (ref 39–50)
HGB BLD-MCNC: 13.4 G/DL — SIGNIFICANT CHANGE UP (ref 13–17)
INR BLD: 1.04 RATIO — SIGNIFICANT CHANGE UP (ref 0.85–1.18)
MAGNESIUM SERPL-MCNC: 2.5 MG/DL — SIGNIFICANT CHANGE UP (ref 1.6–2.6)
MCHC RBC-ENTMCNC: 34.1 PG — HIGH (ref 27–34)
MCHC RBC-ENTMCNC: 35.9 GM/DL — SIGNIFICANT CHANGE UP (ref 32–36)
MCV RBC AUTO: 94.9 FL — SIGNIFICANT CHANGE UP (ref 80–100)
PHOSPHATE SERPL-MCNC: 3.1 MG/DL — SIGNIFICANT CHANGE UP (ref 2.5–4.5)
PLATELET # BLD AUTO: 240 K/UL — SIGNIFICANT CHANGE UP (ref 150–400)
POTASSIUM SERPL-MCNC: 3.2 MMOL/L — LOW (ref 3.5–5.3)
POTASSIUM SERPL-SCNC: 3.2 MMOL/L — LOW (ref 3.5–5.3)
PROT SERPL-MCNC: 6.4 GM/DL — SIGNIFICANT CHANGE UP (ref 6–8.3)
PROTHROM AB SERPL-ACNC: 11.7 SEC — SIGNIFICANT CHANGE UP (ref 9.5–13)
RBC # BLD: 3.93 M/UL — LOW (ref 4.2–5.8)
RBC # FLD: 11.8 % — SIGNIFICANT CHANGE UP (ref 10.3–14.5)
SODIUM SERPL-SCNC: 137 MMOL/L — SIGNIFICANT CHANGE UP (ref 135–145)
TSH SERPL-MCNC: 0.99 UU/ML — SIGNIFICANT CHANGE UP (ref 0.34–4.82)
WBC # BLD: 4.18 K/UL — SIGNIFICANT CHANGE UP (ref 3.8–10.5)
WBC # FLD AUTO: 4.18 K/UL — SIGNIFICANT CHANGE UP (ref 3.8–10.5)

## 2023-09-07 PROCEDURE — 99232 SBSQ HOSP IP/OBS MODERATE 35: CPT

## 2023-09-07 PROCEDURE — 99233 SBSQ HOSP IP/OBS HIGH 50: CPT

## 2023-09-07 PROCEDURE — 95819 EEG AWAKE AND ASLEEP: CPT | Mod: 26

## 2023-09-07 RX ORDER — DEXTROSE MONOHYDRATE, SODIUM CHLORIDE, AND POTASSIUM CHLORIDE 50; .745; 4.5 G/1000ML; G/1000ML; G/1000ML
1000 INJECTION, SOLUTION INTRAVENOUS
Refills: 0 | Status: DISCONTINUED | OUTPATIENT
Start: 2023-09-07 | End: 2023-09-08

## 2023-09-07 RX ADMIN — LEVETIRACETAM 500 MILLIGRAM(S): 250 TABLET, FILM COATED ORAL at 21:26

## 2023-09-07 RX ADMIN — SODIUM CHLORIDE 80 MILLILITER(S): 9 INJECTION, SOLUTION INTRAVENOUS at 00:05

## 2023-09-07 RX ADMIN — DEXTROSE MONOHYDRATE, SODIUM CHLORIDE, AND POTASSIUM CHLORIDE 100 MILLILITER(S): 50; .745; 4.5 INJECTION, SOLUTION INTRAVENOUS at 21:27

## 2023-09-07 RX ADMIN — Medication 1 MILLIGRAM(S): at 09:38

## 2023-09-07 RX ADMIN — SODIUM CHLORIDE 1 GRAM(S): 9 INJECTION INTRAMUSCULAR; INTRAVENOUS; SUBCUTANEOUS at 09:38

## 2023-09-07 RX ADMIN — DEXTROSE MONOHYDRATE, SODIUM CHLORIDE, AND POTASSIUM CHLORIDE 100 MILLILITER(S): 50; .745; 4.5 INJECTION, SOLUTION INTRAVENOUS at 09:38

## 2023-09-07 RX ADMIN — ESCITALOPRAM OXALATE 20 MILLIGRAM(S): 10 TABLET, FILM COATED ORAL at 09:38

## 2023-09-07 RX ADMIN — Medication 1 TABLET(S): at 09:38

## 2023-09-07 RX ADMIN — Medication 100 MILLIGRAM(S): at 09:39

## 2023-09-07 RX ADMIN — LEVETIRACETAM 500 MILLIGRAM(S): 250 TABLET, FILM COATED ORAL at 00:05

## 2023-09-07 RX ADMIN — LEVETIRACETAM 500 MILLIGRAM(S): 250 TABLET, FILM COATED ORAL at 09:38

## 2023-09-08 LAB
ADD ON TEST-SPECIMEN IN LAB: SIGNIFICANT CHANGE UP
ALBUMIN SERPL ELPH-MCNC: 3.3 G/DL — SIGNIFICANT CHANGE UP (ref 3.3–5)
ALP SERPL-CCNC: 78 U/L — SIGNIFICANT CHANGE UP (ref 40–120)
ALT FLD-CCNC: 369 U/L — HIGH (ref 12–78)
ANION GAP SERPL CALC-SCNC: 7 MMOL/L — SIGNIFICANT CHANGE UP (ref 5–17)
AST SERPL-CCNC: 577 U/L — HIGH (ref 15–37)
BILIRUB SERPL-MCNC: 2 MG/DL — HIGH (ref 0.2–1.2)
BUN SERPL-MCNC: 8 MG/DL — SIGNIFICANT CHANGE UP (ref 7–23)
CALCIUM SERPL-MCNC: 8 MG/DL — LOW (ref 8.5–10.1)
CHLORIDE SERPL-SCNC: 103 MMOL/L — SIGNIFICANT CHANGE UP (ref 96–108)
CO2 SERPL-SCNC: 26 MMOL/L — SIGNIFICANT CHANGE UP (ref 22–31)
CREAT SERPL-MCNC: 0.88 MG/DL — SIGNIFICANT CHANGE UP (ref 0.5–1.3)
EGFR: 106 ML/MIN/1.73M2 — SIGNIFICANT CHANGE UP
GLUCOSE SERPL-MCNC: 98 MG/DL — SIGNIFICANT CHANGE UP (ref 70–99)
LIDOCAIN IGE QN: 57 U/L — SIGNIFICANT CHANGE UP (ref 13–75)
POTASSIUM SERPL-MCNC: 3.9 MMOL/L — SIGNIFICANT CHANGE UP (ref 3.5–5.3)
POTASSIUM SERPL-SCNC: 3.9 MMOL/L — SIGNIFICANT CHANGE UP (ref 3.5–5.3)
PROT SERPL-MCNC: 6.7 GM/DL — SIGNIFICANT CHANGE UP (ref 6–8.3)
SODIUM SERPL-SCNC: 136 MMOL/L — SIGNIFICANT CHANGE UP (ref 135–145)

## 2023-09-08 PROCEDURE — 95720 EEG PHY/QHP EA INCR W/VEEG: CPT

## 2023-09-08 PROCEDURE — 99232 SBSQ HOSP IP/OBS MODERATE 35: CPT

## 2023-09-08 PROCEDURE — 74177 CT ABD & PELVIS W/CONTRAST: CPT | Mod: 26

## 2023-09-08 PROCEDURE — 99233 SBSQ HOSP IP/OBS HIGH 50: CPT

## 2023-09-08 RX ORDER — ENOXAPARIN SODIUM 100 MG/ML
40 INJECTION SUBCUTANEOUS EVERY 24 HOURS
Refills: 0 | Status: DISCONTINUED | OUTPATIENT
Start: 2023-09-08 | End: 2023-09-10

## 2023-09-08 RX ADMIN — ENOXAPARIN SODIUM 40 MILLIGRAM(S): 100 INJECTION SUBCUTANEOUS at 21:13

## 2023-09-08 RX ADMIN — Medication 1 TABLET(S): at 10:18

## 2023-09-08 RX ADMIN — SODIUM CHLORIDE 1 GRAM(S): 9 INJECTION INTRAMUSCULAR; INTRAVENOUS; SUBCUTANEOUS at 10:17

## 2023-09-08 RX ADMIN — LEVETIRACETAM 500 MILLIGRAM(S): 250 TABLET, FILM COATED ORAL at 10:18

## 2023-09-08 RX ADMIN — Medication 1 MILLIGRAM(S): at 10:17

## 2023-09-08 RX ADMIN — ESCITALOPRAM OXALATE 20 MILLIGRAM(S): 10 TABLET, FILM COATED ORAL at 10:17

## 2023-09-08 RX ADMIN — Medication 100 MILLIGRAM(S): at 10:17

## 2023-09-08 RX ADMIN — LEVETIRACETAM 500 MILLIGRAM(S): 250 TABLET, FILM COATED ORAL at 21:13

## 2023-09-08 RX ADMIN — DEXTROSE MONOHYDRATE, SODIUM CHLORIDE, AND POTASSIUM CHLORIDE 100 MILLILITER(S): 50; .745; 4.5 INJECTION, SOLUTION INTRAVENOUS at 10:58

## 2023-09-09 LAB
ALBUMIN SERPL ELPH-MCNC: 3.7 G/DL — SIGNIFICANT CHANGE UP (ref 3.3–5)
ALP SERPL-CCNC: 91 U/L — SIGNIFICANT CHANGE UP (ref 40–120)
ALT FLD-CCNC: 452 U/L — HIGH (ref 12–78)
ANION GAP SERPL CALC-SCNC: 4 MMOL/L — LOW (ref 5–17)
AST SERPL-CCNC: 473 U/L — HIGH (ref 15–37)
BILIRUB SERPL-MCNC: 1.5 MG/DL — HIGH (ref 0.2–1.2)
BUN SERPL-MCNC: 7 MG/DL — SIGNIFICANT CHANGE UP (ref 7–23)
CALCIUM SERPL-MCNC: 8.6 MG/DL — SIGNIFICANT CHANGE UP (ref 8.5–10.1)
CHLORIDE SERPL-SCNC: 101 MMOL/L — SIGNIFICANT CHANGE UP (ref 96–108)
CO2 SERPL-SCNC: 27 MMOL/L — SIGNIFICANT CHANGE UP (ref 22–31)
CREAT SERPL-MCNC: 0.89 MG/DL — SIGNIFICANT CHANGE UP (ref 0.5–1.3)
EGFR: 106 ML/MIN/1.73M2 — SIGNIFICANT CHANGE UP
GLUCOSE SERPL-MCNC: 91 MG/DL — SIGNIFICANT CHANGE UP (ref 70–99)
HAV IGM SER-ACNC: SIGNIFICANT CHANGE UP
HBV CORE IGM SER-ACNC: SIGNIFICANT CHANGE UP
HBV SURFACE AG SER-ACNC: SIGNIFICANT CHANGE UP
HCV AB S/CO SERPL IA: 0.06 S/CO — SIGNIFICANT CHANGE UP (ref 0–0.99)
HCV AB SERPL-IMP: SIGNIFICANT CHANGE UP
POTASSIUM SERPL-MCNC: 3.7 MMOL/L — SIGNIFICANT CHANGE UP (ref 3.5–5.3)
POTASSIUM SERPL-SCNC: 3.7 MMOL/L — SIGNIFICANT CHANGE UP (ref 3.5–5.3)
PROT SERPL-MCNC: 7.2 GM/DL — SIGNIFICANT CHANGE UP (ref 6–8.3)
SODIUM SERPL-SCNC: 132 MMOL/L — LOW (ref 135–145)

## 2023-09-09 PROCEDURE — 99232 SBSQ HOSP IP/OBS MODERATE 35: CPT

## 2023-09-09 RX ADMIN — Medication 1 MILLIGRAM(S): at 10:33

## 2023-09-09 RX ADMIN — Medication 1 TABLET(S): at 10:34

## 2023-09-09 RX ADMIN — Medication 100 MILLIGRAM(S): at 10:34

## 2023-09-09 RX ADMIN — LEVETIRACETAM 500 MILLIGRAM(S): 250 TABLET, FILM COATED ORAL at 10:33

## 2023-09-09 RX ADMIN — ENOXAPARIN SODIUM 40 MILLIGRAM(S): 100 INJECTION SUBCUTANEOUS at 21:14

## 2023-09-09 RX ADMIN — LEVETIRACETAM 500 MILLIGRAM(S): 250 TABLET, FILM COATED ORAL at 21:14

## 2023-09-09 RX ADMIN — ESCITALOPRAM OXALATE 20 MILLIGRAM(S): 10 TABLET, FILM COATED ORAL at 10:33

## 2023-09-09 RX ADMIN — SODIUM CHLORIDE 1 GRAM(S): 9 INJECTION INTRAMUSCULAR; INTRAVENOUS; SUBCUTANEOUS at 10:33

## 2023-09-09 NOTE — PROGRESS NOTE ADULT - SUBJECTIVE AND OBJECTIVE BOX
HPI: Pt is a 49 yo male with a pmh/o TBI, prior seizures (states has had a min of 3-4 over past few years with last occurring 2-3 mo ago, unwitnessed but noted to have bitten tongue each time), who chews tobacco and drinks etoh 3-4 night a week (states has about 4 beers each time) with last drink being 2 days ago where he drank a six pack, who is also non compliant with sodium tablets (instead of daily takes every 2-3 days) or follow up with neurologist Dr. Márquez, who is also non compliant with escitalopram for which he takes for depression, who presented to ED after having witness seizure during a job interview. Pt states prior to seizure he felt "sweaty and foggy in the head". States sat in car with cold air and sx subsided and believed interview went well however pt then states he awoke on the floor surrounded by EMT. Pt currently c/o sluggish speech and fatigue. Pt also with pain to left side of tongue where he has a laceration from bite.      ED course: Pt evaluation by neurology, EEG performed and showing evidence of seizure activity. Keppra started.     : no complaints; no seizures today; 24 hr EEG per neuro    : no new complaints  increasing LFTs; CT abdo; GI consult    : no complaints  await GI consult    PHYSICAL EXAM:    Daily Height in cm: 170.18 (06 Sep 2023 12:30)    Daily Weight in k.7 (07 Sep 2023 01:00)    Vital Signs Last 24 Hrs  T(C): 36.8 (09 Sep 2023 08:40), Max: 37.5 (08 Sep 2023 16:14)  T(F): 98.3 (09 Sep 2023 08:40), Max: 99.5 (08 Sep 2023 16:14)  HR: 58 (09 Sep 2023 08:40) (58 - 75)  BP: 127/88 (09 Sep 2023 08:40) (117/82 - 127/88)  BP(mean): --  RR: 16 (09 Sep 2023 08:40) (16 - 18)  SpO2: 96% (09 Sep 2023 08:40) (96% - 99%)    Parameters below as of 09 Sep 2023 08:40  Patient On (Oxygen Delivery Method): room air        Constitutional: Well appearing  HEENT: Atraumatic, LINDSEY,   Respiratory: Breath Sounds normal, no rhonchi/wheeze  Cardiovascular: N S1S2;   Gastrointestinal: Abdomen soft, non tender, Bowel Sounds present  Extremities: No edema, peripheral pulses present  Neurological: AAO x 3, no gross focal motor deficits  Skin: Non cellulitic, no rash, ulcers  Lymph Nodes: No lymphadenopathy noted  Back: No CVA tenderness   Musculoskeletal: non tender  Breasts: Deferred  Genitourinary: deferred  Rectal: Deferred    All Labs/EKG/Radiology/Meds reviewed        132<L>  |  101  |  7   ----------------------------<  91  3.7   |  27  |  0.89    Ca    8.6      09 Sep 2023 06:43    TPro  7.2  /  Alb  3.7  /  TBili  1.5<H>  /  DBili  x   /  AST  473<H>  /  ALT  452<H>  /  AlkPhos  91      Lab Results:  CBC  CBC Full  -  ( 07 Sep 2023 06:58 )  WBC Count : 4.18 K/uL  RBC Count : 3.93 M/uL  Hemoglobin : 13.4 g/dL  Hematocrit : 37.3 %  Platelet Count - Automated : 240 K/uL  Mean Cell Volume : 94.9 fl  Mean Cell Hemoglobin : 34.1 pg  Mean Cell Hemoglobin Concentration : 35.9 gm/dL  Auto Neutrophil # : x  Auto Lymphocyte # : x  Auto Monocyte # : x  Auto Eosinophil # : x  Auto Basophil # : x  Auto Neutrophil % : x  Auto Lymphocyte % : x  Auto Monocyte % : x  Auto Eosinophil % : x  Auto Basophil % : x    .		Differential:	[] Automated		[] Manual  Chemistry      136  |  103  |  8   ----------------------------<  98  3.9   |  26  |  0.88    Ca    8.0<L>      08 Sep 2023 06:25  Phos  3.1       Mg     2.5         TPro  6.7  /  Alb  3.3  /  TBili  2.0<H>  /  DBili  x   /  AST  577<H>  /  ALT  369<H>  /  AlkPhos  78      LIVER FUNCTIONS - ( 08 Sep 2023 06:25 )  Alb: 3.3 g/dL / Pro: 6.7 gm/dL / ALK PHOS: 78 U/L / ALT: 369 U/L / AST: 577 U/L / GGT: x           PT/INR - ( 07 Sep 2023 06:58 )   PT: 11.7 sec;   INR: 1.04 ratio         PTT - ( 07 Sep 2023 06:58 )  PTT:27.2 sec  Urinalysis Basic - ( 08 Sep 2023 06:25 )    Color: x / Appearance: x / SG: x / pH: x  Gluc: 98 mg/dL / Ketone: x  / Bili: x / Urobili: x   Blood: x / Protein: x / Nitrite: x   Leuk Esterase: x / RBC: x / WBC x   Sq Epi: x / Non Sq Epi: x / Bacteria: x        23 @ 07:01  -  23 @ 16:30  --------------------------------------------------------  IN: 0 mL / OUT: 1000 mL / NET: -1000 mL      MICROBIOLOGY/CULTURES:                                13.4   4.18  )-----------( 240      ( 07 Sep 2023 06:58 )             37.3       CBC Full  -  ( 07 Sep 2023 06:58 )  WBC Count : 4.18 K/uL  RBC Count : 3.93 M/uL  Hemoglobin : 13.4 g/dL  Hematocrit : 37.3 %  Platelet Count - Automated : 240 K/uL  Mean Cell Volume : 94.9 fl  Mean Cell Hemoglobin : 34.1 pg  Mean Cell Hemoglobin Concentration : 35.9 gm/dL  Auto Neutrophil # : x  Auto Lymphocyte # : x  Auto Monocyte # : x  Auto Eosinophil # : x  Auto Basophil # : x  Auto Neutrophil % : x  Auto Lymphocyte % : x  Auto Monocyte % : x  Auto Eosinophil % : x  Auto Basophil % : x          137  |  105  |  7   ----------------------------<  93  3.2<L>   |  26  |  0.80    Ca    7.7<L>      07 Sep 2023 06:58  Phos  3.1       Mg     2.5         TPro  6.4  /  Alb  3.3  /  TBili  2.0<H>  /  DBili  x   /  AST  138<H>  /  ALT  126<H>  /  AlkPhos  64        LIVER FUNCTIONS - ( 07 Sep 2023 06:58 )  Alb: 3.3 g/dL / Pro: 6.4 gm/dL / ALK PHOS: 64 U/L / ALT: 126 U/L / AST: 138 U/L / GGT: x             PT/INR - ( 07 Sep 2023 06:58 )   PT: 11.7 sec;   INR: 1.04 ratio         PTT - ( 07 Sep 2023 06:58 )  PTT:27.2 sec    CARDIAC MARKERS ( 06 Sep 2023 12:44 )  x     / x     / 194 U/L / x     / x            Urinalysis Basic - ( 07 Sep 2023 06:58 )    Color: x / Appearance: x / SG: x / pH: x  Gluc: 93 mg/dL / Ketone: x  / Bili: x / Urobili: x   Blood: x / Protein: x / Nitrite: x   Leuk Esterase: x / RBC: x / WBC x   Sq Epi: x / Non Sq Epi: x / Bacteria: x            MEDICATIONS  (STANDING):  dextrose 5% + sodium chloride 0.9% with potassium chloride 20 mEq/L 1000 milliLiter(s) (100 mL/Hr) IV Continuous <Continuous>  escitalopram 20 milliGRAM(s) Oral daily  folic acid 1 milliGRAM(s) Oral daily  levETIRAcetam 500 milliGRAM(s) Oral two times a day  multivitamin 1 Tablet(s) Oral daily  sodium chloride 1 Gram(s) Oral daily  thiamine 100 milliGRAM(s) Oral daily    MEDICATIONS  (PRN):  acetaminophen     Tablet .. 650 milliGRAM(s) Oral every 6 hours PRN Mild Pain (1 - 3)  lidocaine 2% Viscous 5 milliLiter(s) Swish and Spit three times a day PRN Mouth Care  LORazepam   Injectable 2 milliGRAM(s) IV Push every 2 hours PRN CIWA-Ar score increase by 2 points and a total score of 7 or less  LORazepam   Injectable 2 milliGRAM(s) IV Push every 1 hour PRN CIWA-Ar score 8 or greater  LORazepam   Injectable 2 milliGRAM(s) IV Push once PRN Seizure Activity  ondansetron Injectable 4 milliGRAM(s) IV Push every 6 hours PRN Nausea and/or Vomiting    
Interval History:  9/9/23: Reports tongue discomfort but no other new complaints.      MEDICATIONS  (STANDING):  enoxaparin Injectable 40 milliGRAM(s) SubCutaneous every 24 hours  escitalopram 20 milliGRAM(s) Oral daily  folic acid 1 milliGRAM(s) Oral daily  levETIRAcetam 500 milliGRAM(s) Oral two times a day  multivitamin 1 Tablet(s) Oral daily  sodium chloride 1 Gram(s) Oral daily  thiamine 100 milliGRAM(s) Oral daily    MEDICATIONS  (PRN):  acetaminophen     Tablet .. 650 milliGRAM(s) Oral every 6 hours PRN Mild Pain (1 - 3)  lidocaine 2% Viscous 5 milliLiter(s) Swish and Spit three times a day PRN Mouth Care  LORazepam   Injectable 2 milliGRAM(s) IV Push every 2 hours PRN CIWA-Ar score increase by 2 points and a total score of 7 or less  LORazepam   Injectable 2 milliGRAM(s) IV Push once PRN Seizure Activity  LORazepam   Injectable 2 milliGRAM(s) IV Push every 1 hour PRN CIWA-Ar score 8 or greater  ondansetron Injectable 4 milliGRAM(s) IV Push every 6 hours PRN Nausea and/or Vomiting      Allergies    No Known Allergies    Intolerances        PHYSICAL EXAM:  Vital Signs Last 24 Hrs  T(F): 98.3 (09-09-23 @ 08:40)  HR: 58 (09-09-23 @ 08:40)  BP: 127/88 (09-09-23 @ 08:40)  RR: 16 (09-09-23 @ 08:40)    GENERAL: NAD, well-groomed, well-developed  HEAD:  Atraumatic, Normocephalic  Neuro:  Awake, alert, no aphasia  CN: PERRL, EOMI, no nystagmus, no facial weakness, tongue protrudes in the midline  motor: normal tone, no pronator drift, full strength in all four extremities  sensory: intact to light touch  coordination: finger to nose intact bilaterally  gait: narrow based, steady    LABS:    09-09    132<L>  |  101  |  7   ----------------------------<  91  3.7   |  27  |  0.89    Ca    8.6      09 Sep 2023 06:43    TPro  7.2  /  Alb  3.7  /  TBili  1.5<H>  /  DBili  x   /  AST  473<H>  /  ALT  452<H>  /  AlkPhos  91  09-09      Urinalysis Basic - ( 09 Sep 2023 06:43 )    Color: x / Appearance: x / SG: x / pH: x  Gluc: 91 mg/dL / Ketone: x  / Bili: x / Urobili: x   Blood: x / Protein: x / Nitrite: x   Leuk Esterase: x / RBC: x / WBC x   Sq Epi: x / Non Sq Epi: x / Bacteria: x        RADIOLOGY & ADDITIONAL STUDIES:  CT head 9/6/23:  No acute intracranial hemorrhage, brain edema, or mass effect.  Displaced left occipital bone fracture appears chronic.    EEG 9/6/23:  This was an abnormal EEG study in the awake and drowsy states:  -A reported clinical event accompanied by rhythmic activity in the right   frontotemporal region with spread to generalized fast activity  -Generalized slowing following the clinical event.      EEG 9/7/23:  This was an abnormal EEG study in the awake and drowsy states due to:  -A right frontotemporal spike with phase reversal at F4, F8  -Excess beta activity      EEG w/ video 9/7/23-9/8/23:  normal        
HPI: Pt is a 47 yo male with a pmh/o TBI, prior seizures (states has had a min of 3-4 over past few years with last occurring 2-3 mo ago, unwitnessed but noted to have bitten tongue each time), who chews tobacco and drinks etoh 3-4 night a week (states has about 4 beers each time) with last drink being 2 days ago where he drank a six pack, who is also non compliant with sodium tablets (instead of daily takes every 2-3 days) or follow up with neurologist Dr. Márquez, who is also non compliant with escitalopram for which he takes for depression, who presented to ED after having witness seizure during a job interview. Pt states prior to seizure he felt "sweaty and foggy in the head". States sat in car with cold air and sx subsided and believed interview went well however pt then states he awoke on the floor surrounded by EMT. Pt currently c/o sluggish speech and fatigue. Pt also with pain to left side of tongue where he has a laceration from bite.      ED course: Pt evaluation by neurology, EEG performed and showing evidence of seizure activity. Keppra started.     : no complaints; no seizures today; 24 hr EEG per neuro    : no new complaints  increasing LFTs; CT abdo; GI consult    PHYSICAL EXAM:    Daily Height in cm: 170.18 (06 Sep 2023 12:30)    Daily Weight in k.7 (07 Sep 2023 01:00)    Vital Signs Last 24 Hrs  T(C): 37.5 (08 Sep 2023 16:14), Max: 37.8 (08 Sep 2023 01:51)  T(F): 99.5 (08 Sep 2023 16:14), Max: 100 (08 Sep 2023 01:51)  HR: 74 (08 Sep 2023 16:14) (64 - 86)  BP: 123/86 (08 Sep 2023 16:14) (122/83 - 145/92)  BP(mean): --  RR: 18 (08 Sep 2023 16:14) (17 - 18)  SpO2: 96% (08 Sep 2023 16:14) (95% - 99%)    Parameters below as of 08 Sep 2023 16:14  Patient On (Oxygen Delivery Method): room air        Constitutional: Weak  appearing  HEENT: Atraumatic, LINDSEY,   Respiratory: Breath Sounds normal, no rhonchi/wheeze  Cardiovascular: N S1S2;   Gastrointestinal: Abdomen soft, non tender, Bowel Sounds present  Extremities: No edema, peripheral pulses present  Neurological: AAO x 3, no gross focal motor deficits  Skin: Non cellulitic, no rash, ulcers  Lymph Nodes: No lymphadenopathy noted  Back: No CVA tenderness   Musculoskeletal: non tender  Breasts: Deferred  Genitourinary: deferred  Rectal: Deferred    All Labs/EKG/Radiology/Meds reviewed    Lab Results:  CBC  CBC Full  -  ( 07 Sep 2023 06:58 )  WBC Count : 4.18 K/uL  RBC Count : 3.93 M/uL  Hemoglobin : 13.4 g/dL  Hematocrit : 37.3 %  Platelet Count - Automated : 240 K/uL  Mean Cell Volume : 94.9 fl  Mean Cell Hemoglobin : 34.1 pg  Mean Cell Hemoglobin Concentration : 35.9 gm/dL  Auto Neutrophil # : x  Auto Lymphocyte # : x  Auto Monocyte # : x  Auto Eosinophil # : x  Auto Basophil # : x  Auto Neutrophil % : x  Auto Lymphocyte % : x  Auto Monocyte % : x  Auto Eosinophil % : x  Auto Basophil % : x    .		Differential:	[] Automated		[] Manual  Chemistry      136  |  103  |  8   ----------------------------<  98  3.9   |  26  |  0.88    Ca    8.0<L>      08 Sep 2023 06:25  Phos  3.1     -  Mg     2.5     -    TPro  6.7  /  Alb  3.3  /  TBili  2.0<H>  /  DBili  x   /  AST  577<H>  /  ALT  369<H>  /  AlkPhos  78  -    LIVER FUNCTIONS - ( 08 Sep 2023 06:25 )  Alb: 3.3 g/dL / Pro: 6.7 gm/dL / ALK PHOS: 78 U/L / ALT: 369 U/L / AST: 577 U/L / GGT: x           PT/INR - ( 07 Sep 2023 06:58 )   PT: 11.7 sec;   INR: 1.04 ratio         PTT - ( 07 Sep 2023 06:58 )  PTT:27.2 sec  Urinalysis Basic - ( 08 Sep 2023 06:25 )    Color: x / Appearance: x / SG: x / pH: x  Gluc: 98 mg/dL / Ketone: x  / Bili: x / Urobili: x   Blood: x / Protein: x / Nitrite: x   Leuk Esterase: x / RBC: x / WBC x   Sq Epi: x / Non Sq Epi: x / Bacteria: x        23 @ 07:01  -  23 @ 16:30  --------------------------------------------------------  IN: 0 mL / OUT: 1000 mL / NET: -1000 mL      MICROBIOLOGY/CULTURES:                                13.4   4.18  )-----------( 240      ( 07 Sep 2023 06:58 )             37.3       CBC Full  -  ( 07 Sep 2023 06:58 )  WBC Count : 4.18 K/uL  RBC Count : 3.93 M/uL  Hemoglobin : 13.4 g/dL  Hematocrit : 37.3 %  Platelet Count - Automated : 240 K/uL  Mean Cell Volume : 94.9 fl  Mean Cell Hemoglobin : 34.1 pg  Mean Cell Hemoglobin Concentration : 35.9 gm/dL  Auto Neutrophil # : x  Auto Lymphocyte # : x  Auto Monocyte # : x  Auto Eosinophil # : x  Auto Basophil # : x  Auto Neutrophil % : x  Auto Lymphocyte % : x  Auto Monocyte % : x  Auto Eosinophil % : x  Auto Basophil % : x          137  |  105  |  7   ----------------------------<  93  3.2<L>   |  26  |  0.80    Ca    7.7<L>      07 Sep 2023 06:58  Phos  3.1       Mg     2.5         TPro  6.4  /  Alb  3.3  /  TBili  2.0<H>  /  DBili  x   /  AST  138<H>  /  ALT  126<H>  /  AlkPhos  64  -07      LIVER FUNCTIONS - ( 07 Sep 2023 06:58 )  Alb: 3.3 g/dL / Pro: 6.4 gm/dL / ALK PHOS: 64 U/L / ALT: 126 U/L / AST: 138 U/L / GGT: x             PT/INR - ( 07 Sep 2023 06:58 )   PT: 11.7 sec;   INR: 1.04 ratio         PTT - ( 07 Sep 2023 06:58 )  PTT:27.2 sec    CARDIAC MARKERS ( 06 Sep 2023 12:44 )  x     / x     / 194 U/L / x     / x            Urinalysis Basic - ( 07 Sep 2023 06:58 )    Color: x / Appearance: x / SG: x / pH: x  Gluc: 93 mg/dL / Ketone: x  / Bili: x / Urobili: x   Blood: x / Protein: x / Nitrite: x   Leuk Esterase: x / RBC: x / WBC x   Sq Epi: x / Non Sq Epi: x / Bacteria: x            MEDICATIONS  (STANDING):  dextrose 5% + sodium chloride 0.9% with potassium chloride 20 mEq/L 1000 milliLiter(s) (100 mL/Hr) IV Continuous <Continuous>  escitalopram 20 milliGRAM(s) Oral daily  folic acid 1 milliGRAM(s) Oral daily  levETIRAcetam 500 milliGRAM(s) Oral two times a day  multivitamin 1 Tablet(s) Oral daily  sodium chloride 1 Gram(s) Oral daily  thiamine 100 milliGRAM(s) Oral daily    MEDICATIONS  (PRN):  acetaminophen     Tablet .. 650 milliGRAM(s) Oral every 6 hours PRN Mild Pain (1 - 3)  lidocaine 2% Viscous 5 milliLiter(s) Swish and Spit three times a day PRN Mouth Care  LORazepam   Injectable 2 milliGRAM(s) IV Push every 2 hours PRN CIWA-Ar score increase by 2 points and a total score of 7 or less  LORazepam   Injectable 2 milliGRAM(s) IV Push every 1 hour PRN CIWA-Ar score 8 or greater  LORazepam   Injectable 2 milliGRAM(s) IV Push once PRN Seizure Activity  ondansetron Injectable 4 milliGRAM(s) IV Push every 6 hours PRN Nausea and/or Vomiting    
Pt sitting in bed, no further seizures.  He does not recall the seizure he had in ED while having EEG, was started on Keppra    Pt now admits he has had seizures; at least 3 in the past 8 months, most seizures occur in his sleep  He lives with a room mate    His neurologist is  Dr. Li in [facility]    ROS: As above, other ROS Negative      MEDICATIONS  (STANDING):  dextrose 5% + sodium chloride 0.9% with potassium chloride 20 mEq/L 1000 milliLiter(s) (100 mL/Hr) IV Continuous <Continuous>  escitalopram 20 milliGRAM(s) Oral daily  folic acid 1 milliGRAM(s) Oral daily  levETIRAcetam 500 milliGRAM(s) Oral two times a day  multivitamin 1 Tablet(s) Oral daily  sodium chloride 1 Gram(s) Oral daily  thiamine 100 milliGRAM(s) Oral daily      Vital Signs Last 24 Hrs  T(C): 37.3 (07 Sep 2023 08:47), Max: 37.3 (07 Sep 2023 08:47)  T(F): 99.1 (07 Sep 2023 08:47), Max: 99.1 (07 Sep 2023 08:47)  HR: 72 (07 Sep 2023 08:47) (72 - 118)  BP: 141/81 (07 Sep 2023 08:47) (112/66 - 162/96)  BP(mean): 111 (06 Sep 2023 15:30) (107 - 111)  RR: 18 (07 Sep 2023 08:47) (17 - 18)  SpO2: 96% (07 Sep 2023 08:47) (93% - 99%)    Parameters below as of 07 Sep 2023 08:47  Patient On (Oxygen Delivery Method): room air      Neurological exam:  HF: A x O x 3. interactive, normal affect. Speech fluent, No Aphasia or paraphasic errors. Naming /repetition intact   CN: JIMY, EOMI, VFF, facial sensation normal, no NLFD, tongue bite +, Palate moves equally,  Motor: No pronator drift, Strength 5/5 in all 4 ext, normal bulk and tone, no tremor  Sens: Intact to light touch   Reflexes: Symmetric and normal downgoing toes b/l  Coord:  No FNFA,   Gait/Balance: Not tested                        13.4   4.18  )-----------( 240      ( 07 Sep 2023 06:58 )             37.3     09-07    137  |  105  |  7   ----------------------------<  93  3.2<L>   |  26  |  0.80    Ca    7.7<L>      07 Sep 2023 06:58  Phos  3.1     09-07  Mg     2.5     09-07    TPro  6.4  /  Alb  3.3  /  TBili  2.0<H>  /  DBili  x   /  AST  138<H>  /  ALT  126<H>  /  AlkPhos  64  09-07        Radiology report:  --< from: CT Head No Cont (09.06.23 @ 13:50) >  No hydrocephalus, mass effect, midline shift, acute intracranial   hemorrhage, or brain edema.    Encephalomalacia and gliosis in the inferior anterior frontal lobes,   likely post traumatic in nature.    Intraorbital contents unremarkable.    Displaced left occipital bone fracture appears chronic.    And right posterior ethmoid sinus mucosal thickening, remaining   visualized paranasal sinuses and mastoid air cells are clear.    IMPRESSION:    No acute intracranial hemorrhage, brain edema, or mass effect.    Displaced left occipital bone fracture appears chronic.      < from: EEG Awake or Drowsy (09.06.23 @ 16:00) >  EEG Summary/Classification:  This was an abnormal EEG study in the awake and drowsy states:  -A reported clinical event accompanied by rhythmic activity in the right   frontotemporal region with spread to generalized fast activity  -Generalized slowing following the clinical event.    EEG  Impression:  This EEG without video captured a clinical event. Based on the limited   EEG findings (electrodes became removed) there is suspicion of a focal   onset seizure (possibly right frontotemporal) with rapid generalization.  Generalized slowing was likely a postictal phenomenon.  Consider a repeat study if clinically indicated.    
HPI: Pt is a 47 yo male with a pmh/o TBI, prior seizures (states has had a min of 3-4 over past few years with last occurring 2-3 mo ago, unwitnessed but noted to have bitten tongue each time), who chews tobacco and drinks etoh 3-4 night a week (states has about 4 beers each time) with last drink being 2 days ago where he drank a six pack, who is also non compliant with sodium tablets (instead of daily takes every 2-3 days) or follow up with neurologist Dr. Márquez, who is also non compliant with escitalopram for which he takes for depression, who presented to ED after having witness seizure during a job interview. Pt states prior to seizure he felt "sweaty and foggy in the head". States sat in car with cold air and sx subsided and believed interview went well however pt then states he awoke on the floor surrounded by EMT. Pt currently c/o sluggish speech and fatigue. Pt also with pain to left side of tongue where he has a laceration from bite.      ED course: Pt evaluation by neurology, EEG performed and showing evidence of seizure activity. Keppra started.     : no complaints; no seizures today; 24 hr EEG per neuro      PHYSICAL EXAM:    Daily Height in cm: 170.18 (06 Sep 2023 12:30)    Daily Weight in k.7 (07 Sep 2023 01:00)    Vital Signs Last 24 Hrs  T(C): 37.3 (07 Sep 2023 08:47), Max: 37.3 (07 Sep 2023 08:47)  T(F): 99.1 (07 Sep 2023 08:47), Max: 99.1 (07 Sep 2023 08:47)  HR: 72 (07 Sep 2023 08:47) (72 - 118)  BP: 141/81 (07 Sep 2023 08:47) (112/66 - 162/96)  BP(mean): 111 (06 Sep 2023 15:30) (107 - 111)  RR: 18 (07 Sep 2023 08:47) (17 - 18)  SpO2: 96% (07 Sep 2023 08:47) (93% - 99%)    Constitutional: Weak and ill appearing  HEENT: Atraumatic, LINDSEY,   Respiratory: Breath Sounds normal, no rhonchi/wheeze  Cardiovascular: N S1S2;   Gastrointestinal: Abdomen soft, non tender, Bowel Sounds present  Extremities: No edema, peripheral pulses present  Neurological: AAO x 3, no gross focal motor deficits  Skin: Non cellulitic, no rash, ulcers  Lymph Nodes: No lymphadenopathy noted  Back: No CVA tenderness   Musculoskeletal: non tender  Breasts: Deferred  Genitourinary: deferred  Rectal: Deferred    All Labs/EKG/Radiology/Meds reviewed by me                          13.4   4.18  )-----------( 240      ( 07 Sep 2023 06:58 )             37.3       CBC Full  -  ( 07 Sep 2023 06:58 )  WBC Count : 4.18 K/uL  RBC Count : 3.93 M/uL  Hemoglobin : 13.4 g/dL  Hematocrit : 37.3 %  Platelet Count - Automated : 240 K/uL  Mean Cell Volume : 94.9 fl  Mean Cell Hemoglobin : 34.1 pg  Mean Cell Hemoglobin Concentration : 35.9 gm/dL  Auto Neutrophil # : x  Auto Lymphocyte # : x  Auto Monocyte # : x  Auto Eosinophil # : x  Auto Basophil # : x  Auto Neutrophil % : x  Auto Lymphocyte % : x  Auto Monocyte % : x  Auto Eosinophil % : x  Auto Basophil % : x          137  |  105  |  7   ----------------------------<  93  3.2<L>   |  26  |  0.80    Ca    7.7<L>      07 Sep 2023 06:58  Phos  3.1       Mg     2.5     -07    TPro  6.4  /  Alb  3.3  /  TBili  2.0<H>  /  DBili  x   /  AST  138<H>  /  ALT  126<H>  /  AlkPhos  64  09-07      LIVER FUNCTIONS - ( 07 Sep 2023 06:58 )  Alb: 3.3 g/dL / Pro: 6.4 gm/dL / ALK PHOS: 64 U/L / ALT: 126 U/L / AST: 138 U/L / GGT: x             PT/INR - ( 07 Sep 2023 06:58 )   PT: 11.7 sec;   INR: 1.04 ratio         PTT - ( 07 Sep 2023 06:58 )  PTT:27.2 sec    CARDIAC MARKERS ( 06 Sep 2023 12:44 )  x     / x     / 194 U/L / x     / x            Urinalysis Basic - ( 07 Sep 2023 06:58 )    Color: x / Appearance: x / SG: x / pH: x  Gluc: 93 mg/dL / Ketone: x  / Bili: x / Urobili: x   Blood: x / Protein: x / Nitrite: x   Leuk Esterase: x / RBC: x / WBC x   Sq Epi: x / Non Sq Epi: x / Bacteria: x            MEDICATIONS  (STANDING):  dextrose 5% + sodium chloride 0.9% with potassium chloride 20 mEq/L 1000 milliLiter(s) (100 mL/Hr) IV Continuous <Continuous>  escitalopram 20 milliGRAM(s) Oral daily  folic acid 1 milliGRAM(s) Oral daily  levETIRAcetam 500 milliGRAM(s) Oral two times a day  multivitamin 1 Tablet(s) Oral daily  sodium chloride 1 Gram(s) Oral daily  thiamine 100 milliGRAM(s) Oral daily    MEDICATIONS  (PRN):  acetaminophen     Tablet .. 650 milliGRAM(s) Oral every 6 hours PRN Mild Pain (1 - 3)  lidocaine 2% Viscous 5 milliLiter(s) Swish and Spit three times a day PRN Mouth Care  LORazepam   Injectable 2 milliGRAM(s) IV Push every 2 hours PRN CIWA-Ar score increase by 2 points and a total score of 7 or less  LORazepam   Injectable 2 milliGRAM(s) IV Push every 1 hour PRN CIWA-Ar score 8 or greater  LORazepam   Injectable 2 milliGRAM(s) IV Push once PRN Seizure Activity  ondansetron Injectable 4 milliGRAM(s) IV Push every 6 hours PRN Nausea and/or Vomiting    
Pt has no further seizures, 24 hr EEG was hooked up, just removed.   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    Pt now admits he has had seizures; at least 3 in the past 8 months, most seizures occur in his sleep, also admits to drinking ETOH/beer daily  His neurologist is  Dr. Li in [facility]    AST/ALT are rising    ROS: As above, other ROS Negative    MEDICATIONS  (STANDING):  dextrose 5% + sodium chloride 0.9% with potassium chloride 20 mEq/L 1000 milliLiter(s) (100 mL/Hr) IV Continuous <Continuous>  escitalopram 20 milliGRAM(s) Oral daily  folic acid 1 milliGRAM(s) Oral daily  levETIRAcetam 500 milliGRAM(s) Oral two times a day  multivitamin 1 Tablet(s) Oral daily  sodium chloride 1 Gram(s) Oral daily  thiamine 100 milliGRAM(s) Oral daily      Vital Signs Last 24 Hrs  T(C): 36.6 (08 Sep 2023 08:57), Max: 37.8 (08 Sep 2023 01:51)  T(F): 97.9 (08 Sep 2023 08:57), Max: 100 (08 Sep 2023 01:51)  HR: 86 (08 Sep 2023 08:57) (64 - 86)  BP: 145/92 (08 Sep 2023 08:57) (122/83 - 145/92)  BP(mean): --  RR: 18 (08 Sep 2023 08:57) (17 - 18)  SpO2: 95% (08 Sep 2023 08:57) (95% - 99%)    Parameters below as of 08 Sep 2023 08:57  Patient On (Oxygen Delivery Method): room air      Neurological exam:  HF: A x O x 3. interactive, normal affect. Speech fluent, No Aphasia or paraphasic errors. Naming /repetition intact   CN: JIMY, EOMI, VFF, facial sensation normal, no NLFD, tongue bite +, Palate moves equally,  Motor: No pronator drift, Strength 5/5 in all 4 ext, normal bulk and tone, no tremor  Sens: Intact to light touch   Reflexes: Symmetric and normal downgoing toes b/l  Coord:  No FNFA,   Gait/Balance: Not tested                                   13.4   4.18  )-----------( 240      ( 07 Sep 2023 06:58 )             37.3     09-08    136  |  103  |  8   ----------------------------<  98  3.9   |  26  |  0.88    Ca    8.0<L>      08 Sep 2023 06:25  Phos  3.1     09-07  Mg     2.5     09-07    TPro  6.7  /  Alb  3.3  /  TBili  2.0<H>  /  DBili  x   /  AST  577<H>  /  ALT  369<H>  /  AlkPhos  78  09-08          Radiology report:  ---< from: CT Head No Cont (09.06.23 @ 13:50) >  No hydrocephalus, mass effect, midline shift, acute intracranial   hemorrhage, or brain edema.    Encephalomalacia and gliosis in the inferior anterior frontal lobes,   likely post traumatic in nature.    Intraorbital contents unremarkable.    Displaced left occipital bone fracture appears chronic.    And right posterior ethmoid sinus mucosal thickening, remaining   visualized paranasal sinuses and mastoid air cells are clear.    IMPRESSION:    No acute intracranial hemorrhage, brain edema, or mass effect.    Displaced left occipital bone fracture appears chronic.    < from: EEG Awake and Asleep (09.07.23 @ 08:40) >  EEG Clinical Correlate/  Impression:  The findings are suggestive of:  -A region of epileptogenic cortical dysfunction and risk for focal onset seizures.  -Excess beta activity is likely a medication effect which may be seen   with benzodiazepines.  Consider a repeat study if clinically indicated.      < from: EEG Awake or Drowsy (09.06.23 @ 16:00) >  EEG Summary/Classification:  This was an abnormal EEG study in the awake and drowsy states:  -A reported clinical event accompanied by rhythmic activity in the right   frontotemporal region with spread to generalized fast activity  -Generalized slowing following the clinical event.    EEG  Impression:  This EEG without video captured a clinical event. Based on the limited   EEG findings (electrodes became removed) there is suspicion of a focal   onset seizure (possibly right frontotemporal) with rapid generalization.  Generalized slowing was likely a postictal phenomenon.  Consider a repeat study if clinically indicated.

## 2023-09-09 NOTE — PROGRESS NOTE ADULT - ASSESSMENT
47 yo M with PMHx of TBI 3 years ago, subsequently had seizures, f/u with PMD at [place] including a neurologist, does not take any antiseizure medications, takes NaCl pills for salt wasting following TBI; he presented to ED today after a witnessed seizure while at a job interview today, lasted about a minute. In ED, he was initially confused/postictal, upon neuro exam; patient's mental sensorium had improved.  Routine EEG was ordered, while pt was having EEG in ED, he has another GTC seizure lasting between 30-40 seconds, was given 2 mg ativan IV followed by Keppra load.    # 2 seizures within 4-5 hours, history of seizures following TBI, EEG suggestive of focal onset seizure initiating from right frontotemporal region secondary generalization    # History of TBI; pt now admits he has had seizures; at least 3 in the past 8 months, most seizures occur in his sleep, his neurologist is  Dr. Li in [facility]    # Elevated transaminases and creatinine, now trending down    - Continue Keppra 500 Mg twice daily  - 24-hour EEG monitoring today  - Pt informed that he is not permitted to drive for a period of 1 year per [facility] law, unless cleared buy neuro earlier at 6 months  - He needs to be compliant with medication and f/u with Dr. Li in [facility] ([place])  - As d/w pt, Nacl tablets are not going to treat his seizures    Above D/W Dr. Nathan    
47 yo male with pmh/o TBI, etoh abuse, tobacco use, prior seizures, sodium deficiency on salt tabs, admitted due to;    #Seizure:   Admit to med/surg with remote tele and   EEG 24 hs ordered for AM  CPK wnl  s/p 1L IVF, supplemental IVF ordered x 1L  fall, aspiration, seizure precautions  neurochecks q 4 hrs  OOB and ambulate with assistance  Keppra 500 bid   lidocaine swish and spit for tongue laceration      #Renal insufficiency: resolved  Cr 1.36, baseline unknown  IVF   f/u CMP in AM, 0.88    #Hyperglycemia  f/u A1c; 5.2; not diabetic    # Hypokalemia: replace thru iv fluids; normalized    #Transaminitis  #ETOH abuse  CIWA for trigger only  cessation counseling  RUQ US : fatty liver, GB stone/sludge  worsening LFTs, Ct abdo, hepatitis panel, GI consult, normal lipase    #Depression  c/w escitalopram    #Na deficiency   c/w salt tabs    #Tobacco use  cessation counseling  declined NRT    #Noncompliance  Pt non compliant with medications, stating sometimes he forgets and he doesn't really like taking pills  Compliance endorsed  Pt thinks salt tabs would cure seizures. I told him very specifically that they are not going to to do so. He has to take meds as prescribed by neurologist.     # DVT PPX: lovenox    POC discussed with pt, team, Dr. Hodgson.     DISPO: 24 hr EEG normal  CMP in am, GI consult,     NOTE: NO DRIVING FOR 1 YEAR OR UNLESS CLEARED BY NEUROLOGIST  
49 yo M with PMHx of TBI 3 years ago, subsequently had seizures, f/u with PMD at [place] including a neurologist, does not take any antiseizure medications, takes NaCl pills for salt wasting following TBI; he presented to ED today after a witnessed seizure while at a job interview today, lasted about a minute. In ED, he was initially confused/postictal, upon neuro exam; patient's mental sensorium had improved.  Routine EEG was ordered, while pt was having EEG in ED, he has another GTC seizure lasting between 30-40 seconds, was given 2 mg ativan IV followed by Keppra load.    # 2 seizures within 4-5 hours, history of seizures following TBI, EEG X 2 suggestive of focal onset seizure initiating from right frontotemporal region secondary generalization    # History of TBI; pt now admits he has had seizures; at least 3 in the past 8 months, most seizures occur in his sleep, his neurologist is  Dr. Li in [facility]    # Elevated transaminases and creatinine, LFT trending up, admits to daily ETOH use     - Continue Keppra 500 Mg twice daily  - f/u WITH 24-hour EEG monitoring   - Pt informed that he is not permitted to drive for a period of 1 year per [facility] law, unless cleared buy neuro earlier at 6 months  - He needs to be compliant with medication and f/u with Dr. Li in [facility] ([place]ives [place] east)  - As d/w pt, Nacl tablets are not going to treat his seizures    Dr. Tatum is on service from 9/9-9/10/23, she will f/u if needed      
47 yo male with pmh/o TBI, etoh abuse, tobacco use, prior seizures, sodium deficiency on salt tabs, admitted due to;    #Seizure  Admit to med/surg with remote tele and   EEG 24 hs ordered for AM  CPK wnl  s/p 1L IVF, supplemental IVF ordered x 1L  fall, aspiration, seizure precautions  neurochecks q 4 hrs  OOB and ambulate with assitance  Keppra 500 bid ordered  lidocaine swish and spit for tongue laceration  SCD for dvt ppx    #Renal insufficiency  Cr 1.36, baseline unknown  IVF   f/u CMP in AM    #Hyperglycemia  f/u A1c    # Hypokalemia: replace thru iv fluids    #Transaminitis  #ETOH abuse  CIWA for trigger only  cessation counseling  RUQ US : fatty liver, GB stone/sludge    #Depression  c/w escitalopram    #Na deficiency   c/w salt tabs    #Tobacco use  cessation counseling  declined NRT    #Noncompliance  Pt non compliant with medications, stating sometimes he forgets and he doesn't really like taking pills  Compliance endorsed    # DVT PPX: lovenox    POC discussed with pt, team, Dr. Lama.     DISPO: 24 hr EEG  CMP in am      
49 yo male with pmh/o TBI, etoh abuse, tobacco use, prior seizures, sodium deficiency on salt tabs, admitted due to;    #Seizure:   Admit to med/surg with remote tele and   EEG 24 hs ordered for AM  CPK wnl  s/p 1L IVF, supplemental IVF ordered x 1L  fall, aspiration, seizure precautions  neurochecks q 4 hrs  OOB and ambulate with assistance  Keppra 500 bid   lidocaine swish and spit for tongue laceration      #Renal insufficiency: resolved  Cr 1.36, baseline unknown  IVF    Cr 0.88    #Hyperglycemia  f/u A1c; 5.2; not diabetic    # Hypokalemia: replace thru iv fluids; normalized    #Transaminitis:  #ETOH abuse  CIWA for trigger only  cessation counseling done  RUQ US : fatty liver, GB stone/sludge  worsening LFTs, Ct abdo fatty liver,   hepatitis panel neg,   GI consult, awaited   normal lipase  trend LFTs     #Depression  c/w escitalopram    #Na deficiency   c/w salt tabs    #Tobacco use  cessation counseling  declined NRT    #Noncompliance  Pt non compliant with medications, stating sometimes he forgets and he doesn't really like taking pills  Compliance endorsed  Pt thinks salt tabs would cure seizures. I told him very specifically that they are not going to to do so. He has to take meds as prescribed by neurologist.     # DVT PPX: lovenox    POC discussed with pt, team, Dr. Tatum    DISPO:   CMP in am, GI consult,     NOTE: NO DRIVING FOR 1 YEAR OR UNLESS CLEARED BY NEUROLOGIST  
47 yo M with PMHx of TBI 3-4 years ago, subsequently had seizures, f/u with PMD at [place] including a neurologist, does not take any antiseizure medications, takes NaCl pills for salt wasting following TBI; he presented to ED today after a witnessed seizure while at a job interview today, lasted about a minute. In ED, he was initially confused/postictal, upon neuro exam; patient's mental sensorium had improved.  Routine EEG was ordered, while pt was having EEG in ED, he has another GTC seizure lasting between 30-40 seconds, was given 2 mg ativan IV followed by Keppra load.    # 2 seizures within 4-5 hours, history of seizures following TBI, patient unclear about his seizure history, does not recall when last seizure was.  EEG suggestive of focal onset seizure initiating from right frontotemporal region secondary generalization    # History of TBI; pt now admits he has had seizures; at least 3 in the past 8 months, most seizures occur in his sleep, his neurologist is  Dr. Li in [facility]    # Elevated transaminases and creatinine, LFT trending up, admits to daily ETOH use, CT abdomen shows fatty liver.      -Although there is a question of alcohol withdrawal (now on CIWA protocol), I would continue Keppra since EEG shows evidence of underlying risk for focal onset seizures.  - Continue Keppra 500 Mg twice daily  - Pt informed again that he is not permitted to drive for a period of 1 year per [facility] law, unless cleared buy neuro earlier at 6 months  -Discussed the risk of SUDEP and advised him that he can read more at [email or web address]  - He needs to be compliant with medication and f/u with neurology. He has seen Dr. Li in [facility] (lives in [place]) but reports that he has not seen him recently. If he is unable to follow up with Dr. Li, he can see Dr. Doc Colorado in [place] who is affiliated with [facility] and will have access to his records.    Please call back if additional input is needed from neurology.  Dr. Cesar will cover beginning 9/11.

## 2023-09-10 ENCOUNTER — TRANSCRIPTION ENCOUNTER (OUTPATIENT)
Age: 48
End: 2023-09-10

## 2023-09-10 VITALS
SYSTOLIC BLOOD PRESSURE: 145 MMHG | RESPIRATION RATE: 18 BRPM | HEART RATE: 97 BPM | DIASTOLIC BLOOD PRESSURE: 92 MMHG | OXYGEN SATURATION: 97 % | TEMPERATURE: 98 F

## 2023-09-10 LAB
ALBUMIN SERPL ELPH-MCNC: 3.6 G/DL — SIGNIFICANT CHANGE UP (ref 3.3–5)
ALP SERPL-CCNC: 87 U/L — SIGNIFICANT CHANGE UP (ref 40–120)
ALT FLD-CCNC: 382 U/L — HIGH (ref 12–78)
ANION GAP SERPL CALC-SCNC: 4 MMOL/L — LOW (ref 5–17)
AST SERPL-CCNC: 266 U/L — HIGH (ref 15–37)
BILIRUB SERPL-MCNC: 1.4 MG/DL — HIGH (ref 0.2–1.2)
BUN SERPL-MCNC: 7 MG/DL — SIGNIFICANT CHANGE UP (ref 7–23)
CALCIUM SERPL-MCNC: 8.5 MG/DL — SIGNIFICANT CHANGE UP (ref 8.5–10.1)
CHLORIDE SERPL-SCNC: 100 MMOL/L — SIGNIFICANT CHANGE UP (ref 96–108)
CO2 SERPL-SCNC: 26 MMOL/L — SIGNIFICANT CHANGE UP (ref 22–31)
CREAT SERPL-MCNC: 0.9 MG/DL — SIGNIFICANT CHANGE UP (ref 0.5–1.3)
EGFR: 105 ML/MIN/1.73M2 — SIGNIFICANT CHANGE UP
GLUCOSE SERPL-MCNC: 87 MG/DL — SIGNIFICANT CHANGE UP (ref 70–99)
POTASSIUM SERPL-MCNC: 3.5 MMOL/L — SIGNIFICANT CHANGE UP (ref 3.5–5.3)
POTASSIUM SERPL-SCNC: 3.5 MMOL/L — SIGNIFICANT CHANGE UP (ref 3.5–5.3)
PROT SERPL-MCNC: 7.1 GM/DL — SIGNIFICANT CHANGE UP (ref 6–8.3)
SODIUM SERPL-SCNC: 130 MMOL/L — LOW (ref 135–145)

## 2023-09-10 PROCEDURE — 99239 HOSP IP/OBS DSCHRG MGMT >30: CPT

## 2023-09-10 RX ORDER — SODIUM CHLORIDE 9 MG/ML
1 INJECTION INTRAMUSCULAR; INTRAVENOUS; SUBCUTANEOUS
Qty: 60 | Refills: 0
Start: 2023-09-10 | End: 2023-10-09

## 2023-09-10 RX ORDER — FOLIC ACID 0.8 MG
1 TABLET ORAL
Qty: 30 | Refills: 0
Start: 2023-09-10 | End: 2023-10-09

## 2023-09-10 RX ORDER — LEVETIRACETAM 250 MG/1
1 TABLET, FILM COATED ORAL
Qty: 60 | Refills: 0
Start: 2023-09-10 | End: 2023-10-09

## 2023-09-10 RX ADMIN — SODIUM CHLORIDE 1 GRAM(S): 9 INJECTION INTRAMUSCULAR; INTRAVENOUS; SUBCUTANEOUS at 09:25

## 2023-09-10 RX ADMIN — LEVETIRACETAM 500 MILLIGRAM(S): 250 TABLET, FILM COATED ORAL at 09:25

## 2023-09-10 RX ADMIN — Medication 1 MILLIGRAM(S): at 09:25

## 2023-09-10 RX ADMIN — ESCITALOPRAM OXALATE 20 MILLIGRAM(S): 10 TABLET, FILM COATED ORAL at 09:25

## 2023-09-10 NOTE — DISCHARGE NOTE PROVIDER - NSDCMRMEDTOKEN_GEN_ALL_CORE_FT
escitalopram 20 mg oral tablet: 1 tab(s) orally once a day  folic acid 1 mg oral tablet: 1 tab(s) orally once a day  levETIRAcetam 500 mg oral tablet: 1 tab(s) orally 2 times a day  Multiple Vitamins oral tablet: 1 tab(s) orally once a day  sodium chloride 1 g oral tablet: 1 tab(s) orally 2 times a day

## 2023-09-10 NOTE — DISCHARGE NOTE PROVIDER - NSDCCPCAREPLAN_GEN_ALL_CORE_FT
PRINCIPAL DISCHARGE DIAGNOSIS  Diagnosis: Seizure  Assessment and Plan of Treatment: take Keppra 500 mg 2 times a day  You are Not Permitted to Drive for 1 year as per [place] Law  f/u with your PCP/Neurologist in 1-3 days  stop drinking alcohol      SECONDARY DISCHARGE DIAGNOSES  Diagnosis: Transaminitis  Assessment and Plan of Treatment: stop alcohol drinking  check CMP with your PCP in 1-2 days    Diagnosis: Hyponatremia  Assessment and Plan of Treatment: continue salt tabs 2 times a day  check serum Sodium with your PCP in 1-2 days      Diagnosis: Alcohol abuse  Assessment and Plan of Treatment: stop drinking alcohol   f/u with your PCP in 1-2 days    Diagnosis: Tobacco abuse  Assessment and Plan of Treatment: stop tobacco use in all forms

## 2023-09-10 NOTE — DISCHARGE NOTE NURSING/CASE MANAGEMENT/SOCIAL WORK - PATIENT PORTAL LINK FT
You can access the FollowMyHealth Patient Portal offered by [facility] by registering at the following website: [email or web address]. By joining [facility]’s FollowMyHealth portal, you will also be able to view your health information using other applications (apps) compatible with our system.

## 2023-09-10 NOTE — DISCHARGE NOTE PROVIDER - HOSPITAL COURSE
PHYSICAL EXAM:    Daily     Daily     Vital Signs Last 24 Hrs  T(C): 36.5 (10 Sep 2023 08:35), Max: 37.4 (09 Sep 2023 16:15)  T(F): 97.7 (10 Sep 2023 08:35), Max: 99.4 (09 Sep 2023 16:15)  HR: 97 (10 Sep 2023 08:35) (62 - 97)  BP: 145/92 (10 Sep 2023 08:35) (125/84 - 145/92)  BP(mean): 91 (09 Sep 2023 16:15) (91 - 91)  RR: 18 (10 Sep 2023 08:35) (18 - 19)  SpO2: 97% (10 Sep 2023 08:35) (96% - 100%)    Constitutional: Well  appearing  HEENT: Atraumatic, LINDSEY, Normal, No congestion  Respiratory: Breath Sounds normal, no rhonchi/wheeze  Cardiovascular: N S1S2;   Gastrointestinal: Abdomen soft, non tender, Bowel Sounds present  Extremities: No edema, peripheral pulses present  Neurological: AAO x 3, no gross focal motor deficits  Skin: Non cellulitic, no rash, ulcers  Lymph Nodes: No lymphadenopathy noted  Back: No CVA tenderness   Musculoskeletal: non tender  Breasts: Deferred  Genitourinary: deferred  Rectal: Deferred    49 yo male with pmh/o TBI, etoh abuse, tobacco use, prior seizures, sodium deficiency on salt tabs, admitted due to;    #Seizure:   Admit to med/surg with remote tele and   EEG + for seizure focus  s/p 1L IVF, supplemental IVF ordered x 1L  fall, aspiration, seizure precautions  Keppra 500 bid    If he is unable to follow up with Dr. Li, he can see Dr. Doc Colorado in [place] who is affiliated with [facility] and will have access to his records.  Pt informed that he is not permitted to drive for a period of 1 year per [employer] law, unless cleared buy neuro earlier at 6 months    #Renal insufficiency: resolved  Cr 1.36, baseline unknown  IVF given   Cr 0.88    #Hyperglycemia  f/u A1c; 5.2; not diabetic    # Hypokalemia: replace thru iv fluids; normalized    #Transaminitis:  #ETOH abuse  alcohol cessation counseling done  RUQ US : fatty liver, GB stone/sludge  worsening LFTs, Ct abdo fatty liver,   hepatitis panel neg,   GI consult as out pt   normal lipase  trend LFTs , trending down  check CMP in 1-2 days with your PCP    #Depression  c/w escitalopram    # Hyponatremia:   c/w salt tabs bid  check Na level in 1-2 days     #Tobacco use  cessation counseling  declined NRT    #Noncompliance  Pt non compliant with medications, stating sometimes he forgets and he doesn't really like taking pills  Compliance endorsed      NOTE: NO DRIVING FOR 1 YEAR OR UNLESS CLEARED BY NEUROLOGIST    d/c home    time spent 50 min

## 2023-09-10 NOTE — DISCHARGE NOTE PROVIDER - CARE PROVIDER_API CALL
CYNDEE CHOI  [address] [place] [address]  Phone: [phone]  Fax: [phone]  Follow Up Time:     Doc Colorado  Neurology  [address] [place] [address], [address] [address]  Phone: [phone]  Fax: [phone]  Follow Up Time:

## 2023-09-18 DIAGNOSIS — G93.89 OTHER SPECIFIED DISORDERS OF BRAIN: ICD-10-CM

## 2023-09-18 DIAGNOSIS — Z91.148 PATIENT'S OTHER NONCOMPLIANCE WITH MEDICATION REGIMEN FOR OTHER REASON: ICD-10-CM

## 2023-09-18 DIAGNOSIS — E87.1 HYPO-OSMOLALITY AND HYPONATREMIA: ICD-10-CM

## 2023-09-18 DIAGNOSIS — G40.409 OTHER GENERALIZED EPILEPSY AND EPILEPTIC SYNDROMES, NOT INTRACTABLE, WITHOUT STATUS EPILEPTICUS: ICD-10-CM

## 2023-09-18 DIAGNOSIS — S01.512A LACERATION WITHOUT FOREIGN BODY OF ORAL CAVITY, INITIAL ENCOUNTER: ICD-10-CM

## 2023-09-18 DIAGNOSIS — K76.0 FATTY (CHANGE OF) LIVER, NOT ELSEWHERE CLASSIFIED: ICD-10-CM

## 2023-09-18 DIAGNOSIS — E87.6 HYPOKALEMIA: ICD-10-CM

## 2023-09-18 DIAGNOSIS — Z87.820 PERSONAL HISTORY OF TRAUMATIC BRAIN INJURY: ICD-10-CM

## 2023-09-18 DIAGNOSIS — F10.10 ALCOHOL ABUSE, UNCOMPLICATED: ICD-10-CM

## 2023-09-18 DIAGNOSIS — F32.A DEPRESSION, UNSPECIFIED: ICD-10-CM

## 2023-09-18 DIAGNOSIS — R74.01 ELEVATION OF LEVELS OF LIVER TRANSAMINASE LEVELS: ICD-10-CM

## 2023-09-18 DIAGNOSIS — Z72.0 TOBACCO USE: ICD-10-CM

## 2023-09-18 DIAGNOSIS — R73.9 HYPERGLYCEMIA, UNSPECIFIED: ICD-10-CM
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
## 2023-09-18 DIAGNOSIS — T75.89XA OTHER SPECIFIED EFFECTS OF EXTERNAL CAUSES, INITIAL ENCOUNTER: ICD-10-CM
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
## 2024-02-26 RX ORDER — SODIUM CHLORIDE 9 MG/ML
2 INJECTION INTRAMUSCULAR; INTRAVENOUS; SUBCUTANEOUS
Refills: 0 | DISCHARGE

## 2024-02-26 RX ORDER — ESCITALOPRAM OXALATE 10 MG/1
1 TABLET, FILM COATED ORAL
Qty: 0 | Refills: 0 | DISCHARGE

## 2024-06-11 ENCOUNTER — NON-APPOINTMENT (OUTPATIENT)
Age: 49
End: 2024-06-11

## 2024-08-17 ENCOUNTER — NON-APPOINTMENT (OUTPATIENT)
Age: 49
End: 2024-08-17

## 2025-05-04 ENCOUNTER — NON-APPOINTMENT (OUTPATIENT)
Age: 50
End: 2025-05-04